# Patient Record
Sex: MALE | Race: WHITE | NOT HISPANIC OR LATINO | Employment: OTHER | URBAN - NONMETROPOLITAN AREA
[De-identification: names, ages, dates, MRNs, and addresses within clinical notes are randomized per-mention and may not be internally consistent; named-entity substitution may affect disease eponyms.]

---

## 2017-04-12 ENCOUNTER — OFFICE VISIT (OUTPATIENT)
Dept: UROLOGY | Facility: CLINIC | Age: 82
End: 2017-04-12

## 2017-04-12 DIAGNOSIS — R97.20 ELEVATED PSA: Primary | ICD-10-CM

## 2017-04-12 PROCEDURE — 99213 OFFICE O/P EST LOW 20 MIN: CPT | Performed by: UROLOGY

## 2017-04-12 NOTE — PROGRESS NOTES
Chief Complaint:          Chief Complaint   Patient presents with   • Elevated PSA     FU for Elevated PSA.       HPI:   81 y.o. male.    HPI  Pt has had a progressively elevated psa of 6.7 up from 5.4 on 9/30.      Past Medical History:        Past Medical History:   Diagnosis Date   • AAA (abdominal aortic aneurysm)          Current Meds:     Current Outpatient Prescriptions   Medication Sig Dispense Refill   • acetaminophen (TYLENOL) 650 MG 8 hr tablet Take 650 mg by mouth Every 8 (Eight) Hours As Needed for mild pain (1-3).     • Brinzolamide-Brimonidine (SIMBRINZA) 1-0.2 % suspension Apply  to eye.     • chlorthalidone (HYGROTEN) 25 MG tablet Take 25 mg by mouth Daily.     • fenofibrate (TRICOR) 145 MG tablet Take 145 mg by mouth Daily.     • Omega-3 Fatty Acids (FISH OIL) 1000 MG capsule capsule Take 1,000 mg by mouth Daily With Breakfast.     • simvastatin (ZOCOR) 20 MG tablet Take 20 mg by mouth Every Night.       No current facility-administered medications for this visit.         Allergies:      Allergies   Allergen Reactions   • Codeine    • Morphine And Related    • Percocet [Oxycodone-Acetaminophen]         Past Surgical History:     Past Surgical History:   Procedure Laterality Date   • ABDOMINAL AORTIC ANEURYSM REPAIR     • BACK SURGERY     • CARDIAC SURGERY     • GALLBLADDER SURGERY           Social History:     Social History     Social History   • Marital status: Unknown     Spouse name: N/A   • Number of children: N/A   • Years of education: N/A     Occupational History   • Not on file.     Social History Main Topics   • Smoking status: Former Smoker   • Smokeless tobacco: Never Used   • Alcohol use No   • Drug use: No   • Sexual activity: Not on file     Other Topics Concern   • Not on file     Social History Narrative       Family History:     No family history on file.    Review of Systems:     Review of Systems    Physical Exam:     Physical Exam    Procedure:     No notes on  file      Assessment:     Encounter Diagnosis   Name Primary?   • Elevated PSA Yes     No orders of the defined types were placed in this encounter.      Plan:   Will order a free and total psa and see pt in 6 months.    Counseling was given to patient for the following topics diagnostic results. and the interim medical history and current results were reviewed.  A treatment plan with follow-up was made. Total time of the encounter was 21 minutes and 21 minutes were spent discussing Elevated PSA [R97.20] face-to-face.        This document has been electronically signed by Tom Pineda MD April 12, 2017 10:45 AM

## 2017-05-07 ENCOUNTER — HOSPITAL ENCOUNTER (INPATIENT)
Dept: HOSPITAL 79 - ER1 | Age: 82
LOS: 2 days | Discharge: HOME | DRG: 305 | End: 2017-05-09
Attending: HOSPITALIST | Admitting: INTERNAL MEDICINE
Payer: MEDICARE

## 2017-05-07 VITALS — HEIGHT: 68 IN | BODY MASS INDEX: 26.22 KG/M2 | WEIGHT: 173 LBS

## 2017-05-07 DIAGNOSIS — I16.0: Primary | ICD-10-CM

## 2017-05-07 DIAGNOSIS — Z95.820: ICD-10-CM

## 2017-05-07 DIAGNOSIS — R27.0: ICD-10-CM

## 2017-05-07 DIAGNOSIS — Z98.890: ICD-10-CM

## 2017-05-07 DIAGNOSIS — I65.21: ICD-10-CM

## 2017-05-07 DIAGNOSIS — R51: ICD-10-CM

## 2017-05-07 DIAGNOSIS — Z88.6: ICD-10-CM

## 2017-05-07 DIAGNOSIS — E78.5: ICD-10-CM

## 2017-05-07 DIAGNOSIS — I10: ICD-10-CM

## 2017-05-07 DIAGNOSIS — Z88.5: ICD-10-CM

## 2017-05-07 DIAGNOSIS — Z87.891: ICD-10-CM

## 2017-05-07 DIAGNOSIS — E78.1: ICD-10-CM

## 2017-05-07 DIAGNOSIS — Z79.899: ICD-10-CM

## 2017-05-07 DIAGNOSIS — Z90.49: ICD-10-CM

## 2017-05-07 DIAGNOSIS — Z82.3: ICD-10-CM

## 2017-05-07 DIAGNOSIS — H40.9: ICD-10-CM

## 2017-05-07 DIAGNOSIS — R42: ICD-10-CM

## 2017-05-07 DIAGNOSIS — R00.1: ICD-10-CM

## 2017-05-07 LAB
BUN/CREATININE RATIO: 17 (ref 0–10)
HGB BLD-MCNC: 15 GM/DL (ref 14–17.5)
RED BLOOD COUNT: 4.63 M/UL (ref 4.2–5.5)
WHITE BLOOD COUNT: 6.5 K/UL (ref 4.5–11)

## 2017-05-09 LAB
BUN/CREATININE RATIO: 23 (ref 0–10)
HGB BLD-MCNC: 14.8 GM/DL (ref 14–17.5)
RED BLOOD COUNT: 4.55 M/UL (ref 4.2–5.5)
WHITE BLOOD COUNT: 7.2 K/UL (ref 4.5–11)

## 2017-10-24 ENCOUNTER — OFFICE VISIT (OUTPATIENT)
Dept: UROLOGY | Facility: CLINIC | Age: 82
End: 2017-10-24

## 2017-10-24 VITALS — BODY MASS INDEX: 27 KG/M2 | HEIGHT: 67 IN | WEIGHT: 172 LBS

## 2017-10-24 DIAGNOSIS — R97.20 ELEVATED PSA: Primary | ICD-10-CM

## 2017-10-24 PROCEDURE — 99213 OFFICE O/P EST LOW 20 MIN: CPT | Performed by: UROLOGY

## 2017-10-24 NOTE — PROGRESS NOTES
Chief Complaint:          Chief Complaint   Patient presents with   • Elevated PSA     6 month f/u        HPI:   81 y.o. male.    HPI  Pt's psa is 6.6 with 8.8% free.  His psa was 6.7 previous.      Past Medical History:        Past Medical History:   Diagnosis Date   • AAA (abdominal aortic aneurysm)          Current Meds:     Current Outpatient Prescriptions   Medication Sig Dispense Refill   • acetaminophen (TYLENOL) 650 MG 8 hr tablet Take 650 mg by mouth Every 8 (Eight) Hours As Needed for mild pain (1-3).     • Brinzolamide-Brimonidine (SIMBRINZA) 1-0.2 % suspension Apply  to eye.     • chlorthalidone (HYGROTEN) 25 MG tablet Take 25 mg by mouth Daily.     • fenofibrate (TRICOR) 145 MG tablet Take 145 mg by mouth Daily.     • Omega-3 Fatty Acids (FISH OIL) 1000 MG capsule capsule Take 1,000 mg by mouth Daily With Breakfast.     • simvastatin (ZOCOR) 20 MG tablet Take 20 mg by mouth Every Night.       No current facility-administered medications for this visit.         Allergies:      Allergies   Allergen Reactions   • Codeine    • Morphine And Related    • Percocet [Oxycodone-Acetaminophen]         Past Surgical History:     Past Surgical History:   Procedure Laterality Date   • ABDOMINAL AORTIC ANEURYSM REPAIR     • BACK SURGERY     • CARDIAC SURGERY     • GALLBLADDER SURGERY           Social History:     Social History     Social History   • Marital status: Unknown     Spouse name: N/A   • Number of children: N/A   • Years of education: N/A     Occupational History   • Not on file.     Social History Main Topics   • Smoking status: Former Smoker   • Smokeless tobacco: Never Used   • Alcohol use No   • Drug use: No   • Sexual activity: Not on file     Other Topics Concern   • Not on file     Social History Narrative       Family History:     History reviewed. No pertinent family history.    Review of Systems:     Review of Systems    Physical Exam:     Physical Exam   Constitutional: He is oriented to person,  place, and time.   HENT:   Head: Normocephalic and atraumatic.   Right Ear: External ear normal.   Left Ear: External ear normal.   Nose: Nose normal.   Mouth/Throat: Oropharynx is clear and moist.   Eyes: Conjunctivae and EOM are normal. Pupils are equal, round, and reactive to light.   Neck: Normal range of motion. Neck supple. No thyromegaly present.   Cardiovascular: Normal rate, regular rhythm, normal heart sounds and intact distal pulses.    No murmur heard.  Pulmonary/Chest: Effort normal and breath sounds normal. No respiratory distress. He has no wheezes. He has no rales. He exhibits no tenderness.   Abdominal: Soft. Bowel sounds are normal. He exhibits no distension and no mass. There is no tenderness. No hernia.   Genitourinary: Rectum normal, prostate normal and penis normal.   Musculoskeletal: Normal range of motion. He exhibits no edema or tenderness.   Lymphadenopathy:     He has no cervical adenopathy.   Neurological: He is alert and oriented to person, place, and time. No cranial nerve deficit. He exhibits normal muscle tone. Coordination normal.   Skin: Skin is warm. No rash noted.   Psychiatric: He has a normal mood and affect. His behavior is normal. Judgment and thought content normal.   Nursing note and vitals reviewed.      Procedure:     No notes on file      Assessment:     Encounter Diagnosis   Name Primary?   • Elevated PSA Yes     No orders of the defined types were placed in this encounter.      Plan:   Psa of  6.6 is probably within normal limits for men in their 80's.  The free psa of only 8.8 % is a little concerning.  I would recommend that he return in 6 months with a psa and TAMIKO.    Counseling was given to patient for the following topics diagnostic results. and the interim medical history and current results were reviewed.  A treatment plan with follow-up was made. Total time of the encounter was 24 minutes and 24 minutes were spent discussing Elevated PSA [R97.20]  face-to-face.        This document has been electronically signed by Tom Pineda MD October 24, 2017 3:18 PM

## 2018-05-01 ENCOUNTER — OFFICE VISIT (OUTPATIENT)
Dept: UROLOGY | Facility: CLINIC | Age: 83
End: 2018-05-01

## 2018-05-01 DIAGNOSIS — R97.20 ELEVATED PSA: Primary | ICD-10-CM

## 2018-05-01 DIAGNOSIS — N40.2 PROSTATE NODULE: ICD-10-CM

## 2018-05-01 LAB — PSA SERPL-MCNC: 5.56 NG/ML (ref 0–4)

## 2018-05-01 PROCEDURE — 99213 OFFICE O/P EST LOW 20 MIN: CPT | Performed by: UROLOGY

## 2018-05-01 PROCEDURE — 84153 ASSAY OF PSA TOTAL: CPT | Performed by: UROLOGY

## 2018-05-01 PROCEDURE — 36415 COLL VENOUS BLD VENIPUNCTURE: CPT | Performed by: UROLOGY

## 2018-05-01 RX ORDER — LEVOFLOXACIN 500 MG/1
TABLET, FILM COATED ORAL
Qty: 3 TABLET | Refills: 0 | Status: SHIPPED | OUTPATIENT
Start: 2018-05-01 | End: 2019-09-24

## 2018-05-01 RX ORDER — ALPRAZOLAM 0.5 MG/1
TABLET ORAL
Qty: 3 TABLET | Refills: 0 | Status: SHIPPED | OUTPATIENT
Start: 2018-05-01 | End: 2019-09-24

## 2018-05-01 NOTE — PROGRESS NOTES
Chief Complaint:          Elevated psa.    HPI:   82 y.o. male.  Pt here for elevated psa and low free psa.  He denies any voiding symptoms.  HPI      Past Medical History:        Past Medical History:   Diagnosis Date   • AAA (abdominal aortic aneurysm)          Current Meds:     Current Outpatient Prescriptions   Medication Sig Dispense Refill   • acetaminophen (TYLENOL) 650 MG 8 hr tablet Take 650 mg by mouth Every 8 (Eight) Hours As Needed for mild pain (1-3).     • Brinzolamide-Brimonidine (SIMBRINZA) 1-0.2 % suspension Apply  to eye.     • chlorthalidone (HYGROTEN) 25 MG tablet Take 25 mg by mouth Daily.     • fenofibrate (TRICOR) 145 MG tablet Take 145 mg by mouth Daily.     • Omega-3 Fatty Acids (FISH OIL) 1000 MG capsule capsule Take 1,000 mg by mouth Daily With Breakfast.     • simvastatin (ZOCOR) 20 MG tablet Take 20 mg by mouth Every Night.       No current facility-administered medications for this visit.         Allergies:      Allergies   Allergen Reactions   • Codeine    • Morphine And Related    • Percocet [Oxycodone-Acetaminophen]         Past Surgical History:     Past Surgical History:   Procedure Laterality Date   • ABDOMINAL AORTIC ANEURYSM REPAIR     • BACK SURGERY     • CARDIAC SURGERY     • GALLBLADDER SURGERY           Social History:     Social History     Social History   • Marital status: Unknown     Spouse name: N/A   • Number of children: N/A   • Years of education: N/A     Occupational History   • Not on file.     Social History Main Topics   • Smoking status: Former Smoker   • Smokeless tobacco: Never Used   • Alcohol use No   • Drug use: No   • Sexual activity: Not on file     Other Topics Concern   • Not on file     Social History Narrative   • No narrative on file       Family History:     No family history on file.    Review of Systems:     Review of Systems   Constitutional: Negative for chills and fever.   HENT: Negative for congestion, sinus pain and sore throat.     Respiratory: Negative for chest tightness, shortness of breath and wheezing.    Cardiovascular: Negative for chest pain and palpitations.   Gastrointestinal: Negative for diarrhea, nausea and vomiting.   Genitourinary: Negative for dysuria, flank pain and testicular pain.   Neurological: Negative for light-headedness.   Psychiatric/Behavioral: Negative for confusion.       IPSS Questionnaire (AUA-7):  Over the past month…    1)  How often have you had a sensation of not emptying your bladder completely after you finish urinating?  0 - Not at all   2)  How often have you had to urinate again less than two hours after you finished urinating? 0 - Not at all   3)  How often have you found you stopped and started again several times when you urinated?  0 - Not at all   4) How difficult have you found it to postpone urination?  0 - Not at all   5) How often have you had a weak urinary stream?  0 - Not at all   6) How often have you had to push or strain to begin urination?  0 - Not at all   7) How many times did you most typically get up to urinate from the time you went to bed until the time you got up in the morning?  1 - 1 time   Total Score:  1     I have reviewed the follow portions of the patient's history and confirmed they are accurate today:  allergies, current medications, past family history, past medical history, past social history, past surgical history, problem list and ROS  Physical Exam:     Physical Exam   Constitutional: He is oriented to person, place, and time.   HENT:   Head: Normocephalic and atraumatic.   Right Ear: External ear normal.   Left Ear: External ear normal.   Nose: Nose normal.   Mouth/Throat: Oropharynx is clear and moist.   Eyes: Conjunctivae and EOM are normal. Pupils are equal, round, and reactive to light.   Neck: Normal range of motion. Neck supple. No thyromegaly present.   Cardiovascular: Normal rate, regular rhythm, normal heart sounds and intact distal pulses.    No murmur  heard.  Pulmonary/Chest: Effort normal and breath sounds normal. No respiratory distress. He has no wheezes. He has no rales. He exhibits no tenderness.   Abdominal: Soft. Bowel sounds are normal. He exhibits no distension and no mass. There is no tenderness. No hernia.   Genitourinary: Rectum normal and penis normal.   Genitourinary Comments: Pt has a nodule about 5 mm in size in the lateral sulcus on the left   Musculoskeletal: Normal range of motion. He exhibits no edema or tenderness.   Lymphadenopathy:     He has no cervical adenopathy.   Neurological: He is alert and oriented to person, place, and time. No cranial nerve deficit. He exhibits normal muscle tone. Coordination normal.   Skin: Skin is warm. No rash noted.   Psychiatric: He has a normal mood and affect. His behavior is normal. Judgment and thought content normal.   Nursing note and vitals reviewed.      There were no vitals taken for this visit.   Procedure:         Assessment:     Encounter Diagnoses   Name Primary?   • Elevated PSA Yes   • Prostate nodule      No orders of the defined types were placed in this encounter.      Plan:   I would recommend a prostate ultrasound and biopsies  Rx levaquin and xanax for procedure    Patient's There is no height or weight on file to calculate BMI. BMI is within normal parameters. No follow-up required.      Counseling was given to patient for the following topics impressions as follows: prostate nodule. The interim medical history and current results were reviewed.  A treatment plan with follow-up was made for Elevated PSA [R97.20]This document has been electronically signed by Tom Pineda MD May 1, 2018 2:52 PM

## 2018-05-22 ENCOUNTER — TELEPHONE (OUTPATIENT)
Dept: UROLOGY | Facility: CLINIC | Age: 83
End: 2018-05-22

## 2018-05-23 ENCOUNTER — OFFICE VISIT (OUTPATIENT)
Dept: UROLOGY | Facility: CLINIC | Age: 83
End: 2018-05-23

## 2018-05-23 VITALS — WEIGHT: 172 LBS | BODY MASS INDEX: 27 KG/M2 | HEIGHT: 67 IN

## 2018-05-23 DIAGNOSIS — Z48.816 SURGICAL AFTERCARE, GENITOURINARY SYSTEM: ICD-10-CM

## 2018-05-23 DIAGNOSIS — R97.20 ELEVATED PSA: ICD-10-CM

## 2018-05-23 DIAGNOSIS — N40.2 PROSTATE NODULE: Primary | ICD-10-CM

## 2018-05-23 PROCEDURE — 76942 ECHO GUIDE FOR BIOPSY: CPT | Performed by: UROLOGY

## 2018-05-23 PROCEDURE — 76872 US TRANSRECTAL: CPT | Performed by: UROLOGY

## 2018-05-23 PROCEDURE — 96372 THER/PROPH/DIAG INJ SC/IM: CPT | Performed by: UROLOGY

## 2018-05-23 PROCEDURE — 55700 PR PROSTATE NEEDLE BIOPSY ANY APPROACH: CPT | Performed by: UROLOGY

## 2018-05-23 RX ORDER — CEFTRIAXONE 1 G/1
1 INJECTION, POWDER, FOR SOLUTION INTRAMUSCULAR; INTRAVENOUS ONCE
Status: COMPLETED | OUTPATIENT
Start: 2018-05-23 | End: 2018-05-23

## 2018-05-23 RX ADMIN — CEFTRIAXONE 1 G: 1 INJECTION, POWDER, FOR SOLUTION INTRAMUSCULAR; INTRAVENOUS at 14:44

## 2018-05-23 NOTE — PROGRESS NOTES
Chief Complaint:        prostate nodule and elevated psa      HPI:   82 y.o. male.  Pt had a left lateral nodule and a slightly elevated psa of 5.56.  Pt here for pus and bx.  HPI      Past Medical History:        Past Medical History:   Diagnosis Date   • AAA (abdominal aortic aneurysm)          Current Meds:     Current Outpatient Prescriptions   Medication Sig Dispense Refill   • acetaminophen (TYLENOL) 650 MG 8 hr tablet Take 650 mg by mouth Every 8 (Eight) Hours As Needed for mild pain (1-3).     • ALPRAZolam (XANAX) 0.5 MG tablet Bring to office for procedure. Staff will instruct on dose and timing. 3 tablet 0   • Brinzolamide-Brimonidine (SIMBRINZA) 1-0.2 % suspension Apply  to eye.     • chlorthalidone (HYGROTEN) 25 MG tablet Take 25 mg by mouth Daily.     • fenofibrate (TRICOR) 145 MG tablet Take 145 mg by mouth Daily.     • levoFLOXacin (LEVAQUIN) 500 MG tablet Take 1 tablet by mouth daily for three days starting the day prior to procedure. 3 tablet 0   • Omega-3 Fatty Acids (FISH OIL) 1000 MG capsule capsule Take 1,000 mg by mouth Daily With Breakfast.     • simvastatin (ZOCOR) 20 MG tablet Take 20 mg by mouth Every Night.       No current facility-administered medications for this visit.         Allergies:      Allergies   Allergen Reactions   • Codeine    • Morphine And Related    • Percocet [Oxycodone-Acetaminophen]         Past Surgical History:     Past Surgical History:   Procedure Laterality Date   • ABDOMINAL AORTIC ANEURYSM REPAIR     • BACK SURGERY     • CARDIAC SURGERY     • GALLBLADDER SURGERY           Social History:     Social History     Social History   • Marital status: Unknown     Spouse name: N/A   • Number of children: N/A   • Years of education: N/A     Occupational History   • Not on file.     Social History Main Topics   • Smoking status: Former Smoker   • Smokeless tobacco: Never Used   • Alcohol use No   • Drug use: No   • Sexual activity: Not on file     Other Topics Concern   •  Not on file     Social History Narrative   • No narrative on file       Family History:     History reviewed. No pertinent family history.    Review of Systems:     Review of Systems   Constitutional: Negative for chills, fatigue and fever.   HENT: Negative for congestion and sinus pressure.    Respiratory: Negative for shortness of breath.    Cardiovascular: Negative for chest pain.   Gastrointestinal: Negative for abdominal pain, constipation, diarrhea, nausea and vomiting.   Genitourinary: Negative for frequency and urgency.   Musculoskeletal: Negative for back pain and neck pain.   Neurological: Negative for dizziness and headaches.   Hematological: Does not bruise/bleed easily.   Psychiatric/Behavioral: The patient is not nervous/anxious.            I have reviewed the follow portions of the patient's history and confirmed they are accurate today:  allergies, current medications, past family history, past medical history, past social history, past surgical history, problem list and ROS  Physical Exam:     Physical Exam   Constitutional: He is oriented to person, place, and time.   HENT:   Head: Normocephalic and atraumatic.   Right Ear: External ear normal.   Left Ear: External ear normal.   Nose: Nose normal.   Mouth/Throat: Oropharynx is clear and moist.   Eyes: Conjunctivae and EOM are normal. Pupils are equal, round, and reactive to light.   Neck: Normal range of motion. Neck supple. No thyromegaly present.   Cardiovascular: Normal rate, regular rhythm, normal heart sounds and intact distal pulses.    No murmur heard.  Pulmonary/Chest: Effort normal and breath sounds normal. No respiratory distress. He has no wheezes. He has no rales. He exhibits no tenderness.   Abdominal: Soft. Bowel sounds are normal. He exhibits no distension and no mass. There is no tenderness. No hernia.   Genitourinary: Rectum normal and penis normal.   Genitourinary Comments: Prostate nodule on left side small pea size  "  Musculoskeletal: Normal range of motion. He exhibits no edema or tenderness.   Lymphadenopathy:     He has no cervical adenopathy.   Neurological: He is alert and oriented to person, place, and time. No cranial nerve deficit. He exhibits normal muscle tone. Coordination normal.   Skin: Skin is warm. No rash noted.   Psychiatric: He has a normal mood and affect. His behavior is normal. Judgment and thought content normal.   Nursing note and vitals reviewed.      Ht 170.2 cm (67.01\")   Wt 78 kg (172 lb)   BMI 26.93 kg/m²    Procedure:     Procedure: Transrectal Ultrasound and Guided Biopsies    Position: Fetal/left lateral decubitus    Prostate Ultrasound Guided lidocaine prostate block    Sedation: Oral xanax    Indications: prostate nodule and elevated psa    Procedures risks and benefits and risk and alternatives were discussed with the patient. Written Consent was obtained prior to procedure.    In patients without penicillin allergies, preoperative Oral antibiotics and pre-procedure rocephin administered.    Procedure details: 8 Mhz biplanar B&K probe was placed in the rectum. Prostate was scanned from the base of the bladder to the apex. Prostate size was measured at 34 cc prosthetic height 29.6 mm    width 47 mm   length 47mm    Seminal vesicals: normal  Prostate median lobe hypoechoic lesion left lateral base 8 mm in size    Patient is to return in 2 week to discuss pathology      Assessment:     Encounter Diagnoses   Name Primary?   • Surgical aftercare, genitourinary system    • Elevated PSA    • Prostate nodule Yes     No orders of the defined types were placed in this encounter.      Plan:   Will see pt in 2 weeks to discuss pathology    Patient's Body mass index is 26.93 kg/m². BMI is within normal parameters. No follow-up required.      Counseling was given to patient for the following topics diagnostic results including: prostate ultrasound. The interim medical history and current results were " reviewed.  A treatment plan with follow-up was made for Prostate nodule [N40.2].   This document has been electronically signed by Tom Pineda MD May 23, 2018 2:45 PM

## 2018-05-24 ENCOUNTER — TELEPHONE (OUTPATIENT)
Dept: GASTROENTEROLOGY | Facility: CLINIC | Age: 83
End: 2018-05-24

## 2018-05-24 NOTE — TELEPHONE ENCOUNTER
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 SHe is going to fax the pathology over.

## 2018-05-30 ENCOUNTER — OFFICE VISIT (OUTPATIENT)
Dept: UROLOGY | Facility: CLINIC | Age: 83
End: 2018-05-30

## 2018-05-30 VITALS — BODY MASS INDEX: 27 KG/M2 | WEIGHT: 172 LBS | HEIGHT: 67 IN

## 2018-05-30 DIAGNOSIS — C61 PROSTATE CANCER (HCC): Primary | ICD-10-CM

## 2018-05-30 PROCEDURE — 99215 OFFICE O/P EST HI 40 MIN: CPT | Performed by: UROLOGY

## 2018-09-25 ENCOUNTER — OFFICE VISIT (OUTPATIENT)
Dept: UROLOGY | Facility: CLINIC | Age: 83
End: 2018-09-25

## 2018-09-25 VITALS — WEIGHT: 172 LBS | HEIGHT: 67 IN | BODY MASS INDEX: 27 KG/M2

## 2018-09-25 DIAGNOSIS — C61 PROSTATE CANCER (HCC): Primary | ICD-10-CM

## 2018-09-25 PROCEDURE — 99213 OFFICE O/P EST LOW 20 MIN: CPT | Performed by: UROLOGY

## 2018-09-25 NOTE — PROGRESS NOTES
Chief Complaint:          Prostate cancer    HPI:   82 y.o. male.  Pt is sexually active and he would like to keep that activity  HPI      Past Medical History:        Past Medical History:   Diagnosis Date   • AAA (abdominal aortic aneurysm) (CMS/[facility])          Current Meds:     Current Outpatient Prescriptions   Medication Sig Dispense Refill   • acetaminophen (TYLENOL) 650 MG 8 hr tablet Take 650 mg by mouth Every 8 (Eight) Hours As Needed for mild pain (1-3).     • ALPRAZolam (XANAX) 0.5 MG tablet Bring to office for procedure. Staff will instruct on dose and timing. 3 tablet 0   • Brinzolamide-Brimonidine (SIMBRINZA) 1-0.2 % suspension Apply  to eye.     • chlorthalidone (HYGROTEN) 25 MG tablet Take 25 mg by mouth Daily.     • fenofibrate (TRICOR) 145 MG tablet Take 145 mg by mouth Daily.     • levoFLOXacin (LEVAQUIN) 500 MG tablet Take 1 tablet by mouth daily for three days starting the day prior to procedure. 3 tablet 0   • Omega-3 Fatty Acids (FISH OIL) 1000 MG capsule capsule Take 1,000 mg by mouth Daily With Breakfast.     • simvastatin (ZOCOR) 20 MG tablet Take 20 mg by mouth Every Night.       No current facility-administered medications for this visit.         Allergies:      Allergies   Allergen Reactions   • Codeine    • Morphine And Related    • Percocet [Oxycodone-Acetaminophen]         Past Surgical History:     Past Surgical History:   Procedure Laterality Date   • ABDOMINAL AORTIC ANEURYSM REPAIR     • BACK SURGERY     • CARDIAC SURGERY     • GALLBLADDER SURGERY           Social History:     Social History     Social History   • Marital status: Unknown     Spouse name: N/A   • Number of children: N/A   • Years of education: N/A     Occupational History   • Not on file.     Social History Main Topics   • Smoking status: Former Smoker   • Smokeless tobacco: Never Used   • Alcohol use No   • Drug use: No   • Sexual activity: Not on file     Other Topics Concern   • Not on file     Social History  "Narrative   • No narrative on file       Family History:     History reviewed. No pertinent family history.    Review of Systems:     Review of Systems   Constitutional: Negative for fatigue.   HENT: Negative for sinus pain.    Eyes: Negative for pain.   Respiratory: Negative for chest tightness.    Cardiovascular: Negative for chest pain.   Gastrointestinal: Negative for abdominal pain.   Endocrine: Negative for heat intolerance.   Genitourinary: Negative for frequency.   Musculoskeletal: Negative for back pain.   Skin: Negative for rash.   Allergic/Immunologic: Negative for food allergies.   Neurological: Negative for headaches.   Hematological: Does not bruise/bleed easily.       I have reviewed the follow portions of the patient's history and confirmed they are accurate today:  allergies, current medications, past family history, past medical history, past social history, past surgical history, problem list and ROS  Physical Exam:     Physical Exam    Ht 170.2 cm (67.01\")   Wt 78 kg (172 lb)   BMI 26.93 kg/m²    Procedure:         Assessment:     Encounter Diagnosis   Name Primary?   • Prostate cancer (CMS/HCC) Yes     No orders of the defined types were placed in this encounter.      Plan:   I discussed how the quality of life might be best with aggressive surveillance because hormonal therapy would eliminate it and radiation might include lupron as well.  Pt and I are in agreement.  Will see him for psa and TAMIKO 3 month.    Patient's Body mass index is 26.93 kg/m². BMI is within normal parameters. No follow-up required.      Counseling was given to patient for the following topics instructions for management as follows: prostate. The interim medical history and current results were reviewed.  A treatment plan with follow-up was made for Prostate cancer (CMS/HCC) [C61]. I spent 16 minutes face to face with Celso Mcelroy and 80 percentage was spent in counseling.    This document has been electronically signed by " Tom Pineda MD September 25, 2018 3:47 PM

## 2018-12-11 ENCOUNTER — LAB (OUTPATIENT)
Dept: LAB | Facility: HOSPITAL | Age: 83
End: 2018-12-11
Attending: UROLOGY

## 2018-12-11 DIAGNOSIS — C61 PROSTATE CANCER (HCC): ICD-10-CM

## 2018-12-11 LAB — PSA SERPL-MCNC: 7.97 NG/ML (ref 0–4)

## 2018-12-11 PROCEDURE — 84153 ASSAY OF PSA TOTAL: CPT

## 2018-12-11 PROCEDURE — 36415 COLL VENOUS BLD VENIPUNCTURE: CPT

## 2018-12-18 ENCOUNTER — OFFICE VISIT (OUTPATIENT)
Dept: UROLOGY | Facility: CLINIC | Age: 83
End: 2018-12-18

## 2018-12-18 VITALS — WEIGHT: 172 LBS | HEIGHT: 67 IN | BODY MASS INDEX: 27 KG/M2

## 2018-12-18 DIAGNOSIS — C61 PROSTATE CANCER (HCC): Primary | ICD-10-CM

## 2018-12-18 PROCEDURE — 99213 OFFICE O/P EST LOW 20 MIN: CPT | Performed by: UROLOGY

## 2018-12-18 NOTE — PROGRESS NOTES
Chief Complaint:          Prostate cancer    HPI:   82 y.o. male.  Pt is on aggressive surveillance for prostate cancer.  He is sexually active.  His psa this month is 7.97.  Pt had a palpable left lateral prostate nodule and psa of 5.56.  Prostate volume was 34 cc.Pt had 4 cores of 12 positive for prostate cancer grade 3+3=6  Pt has no symptoms.      HPI      Past Medical History:        Past Medical History:   Diagnosis Date   • AAA (abdominal aortic aneurysm) (CMS/HCC)          Current Meds:     Current Outpatient Medications   Medication Sig Dispense Refill   • acetaminophen (TYLENOL) 650 MG 8 hr tablet Take 650 mg by mouth Every 8 (Eight) Hours As Needed for mild pain (1-3).     • Brinzolamide-Brimonidine (SIMBRINZA) 1-0.2 % suspension Apply  to eye.     • chlorthalidone (HYGROTEN) 25 MG tablet Take 25 mg by mouth Daily.     • fenofibrate (TRICOR) 145 MG tablet Take 145 mg by mouth Daily.     • Omega-3 Fatty Acids (FISH OIL) 1000 MG capsule capsule Take 1,000 mg by mouth Daily With Breakfast.     • simvastatin (ZOCOR) 20 MG tablet Take 20 mg by mouth Every Night.     • ALPRAZolam (XANAX) 0.5 MG tablet Bring to office for procedure. Staff will instruct on dose and timing. 3 tablet 0   • levoFLOXacin (LEVAQUIN) 500 MG tablet Take 1 tablet by mouth daily for three days starting the day prior to procedure. 3 tablet 0     No current facility-administered medications for this visit.         Allergies:      Allergies   Allergen Reactions   • Codeine    • Morphine And Related    • Percocet [Oxycodone-Acetaminophen]         Past Surgical History:     Past Surgical History:   Procedure Laterality Date   • ABDOMINAL AORTIC ANEURYSM REPAIR     • BACK SURGERY     • CARDIAC SURGERY     • GALLBLADDER SURGERY           Social History:     Social History     Socioeconomic History   • Marital status: Unknown     Spouse name: Not on file   • Number of children: Not on file   • Years of education: Not on file   • Highest education  level: Not on file   Social Needs   • Financial resource strain: Not on file   • Food insecurity - worry: Not on file   • Food insecurity - inability: Not on file   • Transportation needs - medical: Not on file   • Transportation needs - non-medical: Not on file   Occupational History   • Not on file   Tobacco Use   • Smoking status: Former Smoker   • Smokeless tobacco: Never Used   Substance and Sexual Activity   • Alcohol use: No   • Drug use: No   • Sexual activity: Not on file   Other Topics Concern   • Not on file   Social History Narrative   • Not on file       Family History:     History reviewed. No pertinent family history.    Review of Systems:     Review of Systems   Constitutional: Negative for chills, fatigue and fever.   HENT: Negative for congestion and sinus pressure.    Respiratory: Negative for shortness of breath.    Cardiovascular: Negative for chest pain.   Gastrointestinal: Negative for abdominal pain, constipation, diarrhea, nausea and vomiting.   Genitourinary: Negative for frequency and urgency.   Musculoskeletal: Negative for back pain and neck pain.   Neurological: Negative for dizziness and headaches.   Hematological: Does not bruise/bleed easily.   Psychiatric/Behavioral: The patient is not nervous/anxious.              I have reviewed the follow portions of the patient's history and confirmed they are accurate today:  allergies, current medications, past family history, past medical history, past social history, past surgical history, problem list and ROS  Physical Exam:     Physical Exam   Constitutional: He is oriented to person, place, and time.   HENT:   Head: Normocephalic and atraumatic.   Right Ear: External ear normal.   Left Ear: External ear normal.   Nose: Nose normal.   Mouth/Throat: Oropharynx is clear and moist.   Eyes: Conjunctivae and EOM are normal. Pupils are equal, round, and reactive to light.   Neck: Normal range of motion. Neck supple. No thyromegaly present.  "  Cardiovascular: Normal rate, regular rhythm, normal heart sounds and intact distal pulses.   No murmur heard.  Pulmonary/Chest: Effort normal and breath sounds normal. No respiratory distress. He has no wheezes. He has no rales. He exhibits no tenderness.   Abdominal: Soft. Bowel sounds are normal. He exhibits no distension and no mass. There is no tenderness. No hernia.   Genitourinary: Rectum normal and penis normal.   Genitourinary Comments: Pt's prostate nodule is pea sized.  It has not changed in size.   Musculoskeletal: Normal range of motion. He exhibits no edema or tenderness.   Lymphadenopathy:     He has no cervical adenopathy.   Neurological: He is alert and oriented to person, place, and time. No cranial nerve deficit. He exhibits normal muscle tone. Coordination normal.   Skin: Skin is warm. No rash noted.   Psychiatric: He has a normal mood and affect. His behavior is normal. Judgment and thought content normal.   Nursing note and vitals reviewed.      Ht 170.2 cm (67\")   Wt 78 kg (172 lb)   BMI 26.94 kg/m²    Procedure:         Assessment:     Encounter Diagnosis   Name Primary?   • Prostate cancer (CMS/HCC) Yes     No orders of the defined types were placed in this encounter.      Plan:   I discussed the slight change in his psa.  Will see him in 3 months with psa prior.    Patient's Body mass index is 26.94 kg/m². BMI is within normal parameters. No follow-up required.      Counseling was given to patient for the following topics diagnostic results including: psa. The interim medical history and current results were reviewed.  A treatment plan with follow-up was made for Prostate cancer (CMS/HCC) [C61].This document has been electronically signed by Tom Pineda MD December 18, 2018 9:46 AM  "

## 2019-03-05 ENCOUNTER — LAB (OUTPATIENT)
Dept: LAB | Facility: HOSPITAL | Age: 84
End: 2019-03-05

## 2019-03-05 DIAGNOSIS — C61 PROSTATE CANCER (HCC): ICD-10-CM

## 2019-03-05 LAB — PSA SERPL-MCNC: 7.75 NG/ML (ref 0–4)

## 2019-03-05 PROCEDURE — 84153 ASSAY OF PSA TOTAL: CPT

## 2019-03-05 PROCEDURE — 36415 COLL VENOUS BLD VENIPUNCTURE: CPT

## 2019-03-19 ENCOUNTER — OFFICE VISIT (OUTPATIENT)
Dept: UROLOGY | Facility: CLINIC | Age: 84
End: 2019-03-19

## 2019-03-19 VITALS — BODY MASS INDEX: 27.47 KG/M2 | HEIGHT: 67 IN | WEIGHT: 175 LBS

## 2019-03-19 DIAGNOSIS — C61 PROSTATE CANCER (HCC): Primary | ICD-10-CM

## 2019-03-19 PROCEDURE — 99213 OFFICE O/P EST LOW 20 MIN: CPT | Performed by: UROLOGY

## 2019-03-19 NOTE — PROGRESS NOTES
Chief Complaint:          Prostate cancer    HPI:   83 y.o. male.  Pt's psa is 7.75 down from 7.97 in 12/18  In 2010 he had 2 cores + for prostate cancer grade 3 with score of 6.  Repeat biopsies in 5/2018 showed the same grade and 2 of 12 cores positive.  Pt is on aggressive surveillance for prostate cancer.  He is sexually active.  His psa this month is 7.97.  Pt had a palpable left lateral prostate nodule and psa of 5.56.  Prostate volume was 34 cc.Pt had 4 cores of 12 positive for prostate cancer grade 3+3=6  Pt has no symptoms.      HPI      Past Medical History:        Past Medical History:   Diagnosis Date   • AAA (abdominal aortic aneurysm) (CMS/[facility])          Current Meds:     Current Outpatient Medications   Medication Sig Dispense Refill   • acetaminophen (TYLENOL) 650 MG 8 hr tablet Take 650 mg by mouth Every 8 (Eight) Hours As Needed for mild pain (1-3).     • ALPRAZolam (XANAX) 0.5 MG tablet Bring to office for procedure. Staff will instruct on dose and timing. 3 tablet 0   • Brinzolamide-Brimonidine (SIMBRINZA) 1-0.2 % suspension Apply  to eye.     • chlorthalidone (HYGROTEN) 25 MG tablet Take 25 mg by mouth Daily.     • fenofibrate (TRICOR) 145 MG tablet Take 145 mg by mouth Daily.     • levoFLOXacin (LEVAQUIN) 500 MG tablet Take 1 tablet by mouth daily for three days starting the day prior to procedure. 3 tablet 0   • Omega-3 Fatty Acids (FISH OIL) 1000 MG capsule capsule Take 1,000 mg by mouth Daily With Breakfast.     • simvastatin (ZOCOR) 20 MG tablet Take 20 mg by mouth Every Night.       No current facility-administered medications for this visit.         Allergies:      Allergies   Allergen Reactions   • Codeine    • Morphine And Related    • Percocet [Oxycodone-Acetaminophen]         Past Surgical History:     Past Surgical History:   Procedure Laterality Date   • ABDOMINAL AORTIC ANEURYSM REPAIR     • BACK SURGERY     • CARDIAC SURGERY     • GALLBLADDER SURGERY           Social History:      Social History     Socioeconomic History   • Marital status: Unknown     Spouse name: Not on file   • Number of children: Not on file   • Years of education: Not on file   • Highest education level: Not on file   Social Needs   • Financial resource strain: Not on file   • Food insecurity - worry: Not on file   • Food insecurity - inability: Not on file   • Transportation needs - medical: Not on file   • Transportation needs - non-medical: Not on file   Occupational History   • Not on file   Tobacco Use   • Smoking status: Former Smoker   • Smokeless tobacco: Never Used   Substance and Sexual Activity   • Alcohol use: No   • Drug use: No   • Sexual activity: Not on file   Other Topics Concern   • Not on file   Social History Narrative   • Not on file       Family History:     History reviewed. No pertinent family history.    Review of Systems:     Review of Systems   Constitutional: Negative for chills, fatigue and fever.   HENT: Negative for sinus pain.    Respiratory: Negative for cough.    Cardiovascular: Negative for leg swelling.   Gastrointestinal: Negative for abdominal pain and constipation.   Genitourinary: Positive for frequency. Negative for dysuria and urgency.   Neurological: Negative for headaches.   Psychiatric/Behavioral: The patient is not nervous/anxious.          Physical Exam:     Physical Exam   Constitutional: He is oriented to person, place, and time.   HENT:   Head: Normocephalic and atraumatic.   Right Ear: External ear normal.   Left Ear: External ear normal.   Nose: Nose normal.   Mouth/Throat: Oropharynx is clear and moist.   Eyes: Conjunctivae and EOM are normal. Pupils are equal, round, and reactive to light.   Neck: Normal range of motion. Neck supple. No thyromegaly present.   Cardiovascular: Normal rate, regular rhythm, normal heart sounds and intact distal pulses.   No murmur heard.  Pulmonary/Chest: Effort normal and breath sounds normal. No respiratory distress. He has no  "wheezes. He has no rales. He exhibits no tenderness.   Abdominal: Soft. Bowel sounds are normal. He exhibits no distension and no mass. There is no tenderness. No hernia.   Genitourinary: Rectum normal, prostate normal and penis normal.   Genitourinary Comments: Pea size nodule is not changed on the left side.   Musculoskeletal: Normal range of motion. He exhibits no edema or tenderness.   Lymphadenopathy:     He has no cervical adenopathy.   Neurological: He is alert and oriented to person, place, and time. No cranial nerve deficit. He exhibits normal muscle tone. Coordination normal.   Skin: Skin is warm. No rash noted.   Psychiatric: He has a normal mood and affect. His behavior is normal. Judgment and thought content normal.   Nursing note and vitals reviewed.      Ht 170.2 cm (67\")   Wt 79.4 kg (175 lb)   BMI 27.41 kg/m²    Procedure:         Assessment:     Encounter Diagnosis   Name Primary?   • Prostate cancer (CMS/HCC) Yes     No orders of the defined types were placed in this encounter.      Plan:   Pt's left pea side nodule is unchanged and his psa is stable and will see him back in 6 months with psa prior and will repeat TAMIKO.  Pt and I agree that we should wait until the psa doubles about to the level of 16.    Patient's Body mass index is 27.41 kg/m². BMI is within normal parameters. No follow-up required..      Counseling was given to patient for the following topics diagnostic results including: psa. The interim medical history and current results were reviewed.  A treatment plan with follow-up was made for Prostate cancer (CMS/HCC) [C61].  This document has been electronically signed by Tom Pineda MD March 19, 2019 11:08 AM  "

## 2019-09-16 ENCOUNTER — LAB (OUTPATIENT)
Dept: LAB | Facility: HOSPITAL | Age: 84
End: 2019-09-16

## 2019-09-16 DIAGNOSIS — C61 PROSTATE CANCER (HCC): ICD-10-CM

## 2019-09-16 LAB — PSA SERPL-MCNC: 10.1 NG/ML (ref 0–4)

## 2019-09-16 PROCEDURE — 84153 ASSAY OF PSA TOTAL: CPT

## 2019-09-16 PROCEDURE — 36415 COLL VENOUS BLD VENIPUNCTURE: CPT

## 2019-09-24 ENCOUNTER — OFFICE VISIT (OUTPATIENT)
Dept: UROLOGY | Facility: CLINIC | Age: 84
End: 2019-09-24

## 2019-09-24 VITALS
HEIGHT: 67 IN | WEIGHT: 169 LBS | BODY MASS INDEX: 26.53 KG/M2 | DIASTOLIC BLOOD PRESSURE: 62 MMHG | SYSTOLIC BLOOD PRESSURE: 118 MMHG

## 2019-09-24 DIAGNOSIS — C61 PROSTATE CANCER (HCC): Primary | ICD-10-CM

## 2019-09-24 PROCEDURE — 99214 OFFICE O/P EST MOD 30 MIN: CPT | Performed by: UROLOGY

## 2019-09-24 RX ORDER — TRAVOPROST 0.004 %
DROPS OPHTHALMIC (EYE) AS NEEDED
COMMUNITY
Start: 2019-08-14 | End: 2021-11-18 | Stop reason: ALTCHOICE

## 2019-09-24 RX ORDER — ALPRAZOLAM 0.5 MG/1
TABLET ORAL
Qty: 3 TABLET | Refills: 0 | Status: SHIPPED | OUTPATIENT
Start: 2019-09-24 | End: 2019-10-29

## 2019-09-24 RX ORDER — LEVOFLOXACIN 500 MG/1
TABLET, FILM COATED ORAL
Qty: 3 TABLET | Refills: 0 | Status: SHIPPED | OUTPATIENT
Start: 2019-09-24 | End: 2019-10-29

## 2019-09-24 RX ORDER — AMLODIPINE BESYLATE 2.5 MG/1
2.5 TABLET ORAL EVERY EVENING
COMMUNITY
Start: 2019-09-04

## 2019-09-24 RX ORDER — VALSARTAN 320 MG/1
320 TABLET ORAL
COMMUNITY
Start: 2019-07-08

## 2019-09-24 NOTE — PROGRESS NOTES
Chief Complaint:          Prostate cancer    HPI:   83 y.o. male.  Pt is on aggressive surveillance for prostate cancer.  He is sexually active.  His psa this month is 10.1.  Pt had a palpable left lateral prostate nodule and psa 6 months ago was 7.7.  Prostate volume was 34 cc.Pt had 4 cores of 12 positive for prostate cancer grade 3+3=6  Pt has no symptoms.  Pt has been watched for his prostate cancer for 2 years.  Pt is a non smoker.  His family usually lived into their late 90's if no accidents befell them.  HPI      Past Medical History:        Past Medical History:   Diagnosis Date   • AAA (abdominal aortic aneurysm) (CMS/[facility])          Current Meds:     Current Outpatient Medications   Medication Sig Dispense Refill   • acetaminophen (TYLENOL) 650 MG 8 hr tablet Take 650 mg by mouth Every 8 (Eight) Hours As Needed for mild pain (1-3).     • amLODIPine (NORVASC) 2.5 MG tablet Daily.     • Brinzolamide-Brimonidine (SIMBRINZA) 1-0.2 % suspension Apply  to eye.     • chlorthalidone (HYGROTEN) 25 MG tablet Take 25 mg by mouth Daily.     • fenofibrate (TRICOR) 145 MG tablet Take 145 mg by mouth Daily.     • Omega-3 Fatty Acids (FISH OIL) 1000 MG capsule capsule Take 1,000 mg by mouth Daily With Breakfast.     • simvastatin (ZOCOR) 20 MG tablet Take 20 mg by mouth Every Night.     • TRAVATAN Z 0.004 % solution ophthalmic solution As Needed.     • valsartan (DIOVAN) 320 MG tablet Daily.       No current facility-administered medications for this visit.         Allergies:      Allergies   Allergen Reactions   • Codeine    • Morphine And Related    • Percocet [Oxycodone-Acetaminophen]         Past Surgical History:     Past Surgical History:   Procedure Laterality Date   • ABDOMINAL AORTIC ANEURYSM REPAIR     • BACK SURGERY     • CARDIAC SURGERY     • GALLBLADDER SURGERY           Social History:     Social History     Socioeconomic History   • Marital status: Unknown     Spouse name: Not on file   • Number of children:  Not on file   • Years of education: Not on file   • Highest education level: Not on file   Tobacco Use   • Smoking status: Former Smoker   • Smokeless tobacco: Never Used   Substance and Sexual Activity   • Alcohol use: No   • Drug use: No       Family History:     Family History   Problem Relation Age of Onset   • No Known Problems Father    • No Known Problems Mother        Review of Systems:     Review of Systems   Constitutional: Negative for chills and fever.   HENT: Negative for sinus pressure and sinus pain.    Respiratory: Negative for cough.    Cardiovascular: Negative for leg swelling.   Gastrointestinal: Negative for abdominal pain.   Genitourinary: Negative for dysuria, frequency and urgency.   Musculoskeletal: Negative for back pain.   Neurological: Negative for headaches.   Psychiatric/Behavioral: The patient is not nervous/anxious.        IPSS Questionnaire (AUA-7):  Over the past month…    1)  Incomplete Emptying  How often have you had a sensation of not emptying your bladder?  0 - Not at all   2)  Frequency  How often have you had to urinate less than every two hours? 0 - Not at all   3)  Intermittency  How often have you found you stopped and started again several times when you urinated?  0 - Not at all   4) Urgency  How often have you found it difficult to postpone urination?  0 - Not at all   5) Weak Stream  How often have you had a weak urinary stream?  0 - Not at all   6) Straining  How often have you had to push or strain to begin urination?  0 - Not at all   7) Nocturia  How many times did you typically get up at night to urinate?  2 - 2 times   Total Score:  2       Quality of life due to urinary symptoms:  If you were to spend the rest of your life with your urinary condition the way it is now, how would you feel about that? 1-Pleased   Urine Leakage (Incontinence) 0-No Leakage       I have reviewed the follow portions of the patient's history and confirmed they are accurate today:   "allergies, current medications, past family history, past medical history, past social history, past surgical history, problem list and ROS  Physical Exam:     Physical Exam   Constitutional: He is oriented to person, place, and time.   HENT:   Head: Normocephalic and atraumatic.   Right Ear: External ear normal.   Left Ear: External ear normal.   Nose: Nose normal.   Mouth/Throat: Oropharynx is clear and moist.   Eyes: Conjunctivae and EOM are normal. Pupils are equal, round, and reactive to light.   Neck: Normal range of motion. Neck supple. No thyromegaly present.   Cardiovascular: Normal rate, regular rhythm, normal heart sounds and intact distal pulses.   No murmur heard.  Pulmonary/Chest: Effort normal and breath sounds normal. No respiratory distress. He has no wheezes. He has no rales. He exhibits no tenderness.   Abdominal: Soft. Bowel sounds are normal. He exhibits no distension and no mass. There is no tenderness. No hernia.   Genitourinary: Rectum normal, prostate normal and penis normal.   Genitourinary Comments: Nodule on the left hard about the same size as last exam.   Musculoskeletal: Normal range of motion. He exhibits no edema or tenderness.   Lymphadenopathy:     He has no cervical adenopathy.   Neurological: He is alert and oriented to person, place, and time. No cranial nerve deficit. He exhibits normal muscle tone. Coordination normal.   Skin: Skin is warm. No rash noted.   Psychiatric: He has a normal mood and affect. His behavior is normal. Judgment and thought content normal.   Nursing note and vitals reviewed.      /62 (BP Location: Left arm, Patient Position: Sitting, Cuff Size: Adult)   Ht 170.2 cm (67\")   Wt 76.7 kg (169 lb)   BMI 26.47 kg/m²    Procedure:         Assessment:     Encounter Diagnosis   Name Primary?   • Prostate cancer (CMS/HCC) Yes       No orders of the defined types were placed in this encounter.      Patient reports that he is not currently experiencing any " symptoms of urinary incontinence.      Plan:   With his psa climbing and a nodule on exam I think we should repeat his prostate ultrasound and biopsieis.    Patient's Body mass index is 26.47 kg/m². BMI is within normal parameters. No follow-up required..      Smoking Cessation Counseling:  Never a smoker.  Patient does not currently use any tobacco products.       Counseling was given to patient for the following topics instructions for management as follows: prostate cancer. The interim medical history and current results were reviewed.  A treatment plan with follow-up was made for Prostate cancer (CMS/[facility]) [C61].   This document has been electronically signed by Tom Pineda MD September 24, 2019 10:08 AM

## 2019-10-15 ENCOUNTER — PRIOR AUTHORIZATION (OUTPATIENT)
Dept: UROLOGY | Facility: CLINIC | Age: 84
End: 2019-10-15

## 2019-10-15 NOTE — TELEPHONE ENCOUNTER
Patient will need to pay $17.75 on the day of his procedure. Tried calling patient but his phone just rang no answer.

## 2019-10-23 ENCOUNTER — PROCEDURE VISIT (OUTPATIENT)
Dept: UROLOGY | Facility: CLINIC | Age: 84
End: 2019-10-23

## 2019-10-23 VITALS — BODY MASS INDEX: 26.59 KG/M2 | WEIGHT: 169.4 LBS | HEIGHT: 67 IN

## 2019-10-23 DIAGNOSIS — Z48.816 AFTERCARE FOLLOWING SURGERY OF THE GENITOURINARY SYSTEM: ICD-10-CM

## 2019-10-23 DIAGNOSIS — C61 PROSTATE CANCER (HCC): Primary | ICD-10-CM

## 2019-10-23 PROCEDURE — 96372 THER/PROPH/DIAG INJ SC/IM: CPT | Performed by: UROLOGY

## 2019-10-23 PROCEDURE — 55700 PR PROSTATE NEEDLE BIOPSY ANY APPROACH: CPT | Performed by: UROLOGY

## 2019-10-23 PROCEDURE — 76872 US TRANSRECTAL: CPT | Performed by: UROLOGY

## 2019-10-23 RX ORDER — CEFTRIAXONE 1 G/1
1 INJECTION, POWDER, FOR SOLUTION INTRAMUSCULAR; INTRAVENOUS ONCE
Status: COMPLETED | OUTPATIENT
Start: 2019-10-23 | End: 2019-10-23

## 2019-10-23 RX ADMIN — CEFTRIAXONE 1 G: 1 INJECTION, POWDER, FOR SOLUTION INTRAMUSCULAR; INTRAVENOUS at 14:36

## 2019-10-23 NOTE — PROGRESS NOTES
Chief Complaint:          Prostate cancer    HPI:   83 y.o. male.  Elevated psa of 10 in pt with known prostate cancer.  HPI      Past Medical History:        Past Medical History:   Diagnosis Date   • AAA (abdominal aortic aneurysm) (CMS/HCC)          Current Meds:     Current Outpatient Medications   Medication Sig Dispense Refill   • acetaminophen (TYLENOL) 650 MG 8 hr tablet Take 650 mg by mouth Every 8 (Eight) Hours As Needed for mild pain (1-3).     • ALPRAZolam (XANAX) 0.5 MG tablet Bring to office for procedure. Staff will instruct on dose and timing. 3 tablet 0   • amLODIPine (NORVASC) 2.5 MG tablet Daily.     • Brinzolamide-Brimonidine (SIMBRINZA) 1-0.2 % suspension Apply  to eye.     • chlorthalidone (HYGROTEN) 25 MG tablet Take 25 mg by mouth Daily.     • fenofibrate (TRICOR) 145 MG tablet Take 145 mg by mouth Daily.     • levoFLOXacin (LEVAQUIN) 500 MG tablet Take 1 tablet by mouth daily for three days starting the day prior to procedure. 3 tablet 0   • Omega-3 Fatty Acids (FISH OIL) 1000 MG capsule capsule Take 1,000 mg by mouth Daily With Breakfast.     • simvastatin (ZOCOR) 20 MG tablet Take 20 mg by mouth Every Night.     • TRAVATAN Z 0.004 % solution ophthalmic solution As Needed.     • valsartan (DIOVAN) 320 MG tablet Daily.       Current Facility-Administered Medications   Medication Dose Route Frequency Provider Last Rate Last Dose   • cefTRIAXone (ROCEPHIN) injection 1 g  1 g Intramuscular Once Tom Pineda MD            Allergies:      Allergies   Allergen Reactions   • Codeine    • Morphine And Related    • Percocet [Oxycodone-Acetaminophen]         Past Surgical History:     Past Surgical History:   Procedure Laterality Date   • ABDOMINAL AORTIC ANEURYSM REPAIR     • BACK SURGERY     • CARDIAC SURGERY     • GALLBLADDER SURGERY           Social History:     Social History     Socioeconomic History   • Marital status: Unknown     Spouse name: Not on file   • Number of children: Not  "on file   • Years of education: Not on file   • Highest education level: Not on file   Tobacco Use   • Smoking status: Former Smoker   • Smokeless tobacco: Never Used   Substance and Sexual Activity   • Alcohol use: No   • Drug use: No       Family History:     Family History   Problem Relation Age of Onset   • No Known Problems Father    • No Known Problems Mother        Review of Systems:     Review of Systems   Constitutional: Negative for chills, fatigue and fever.   HENT: Negative for congestion and sinus pressure.    Respiratory: Negative for shortness of breath.    Cardiovascular: Negative for chest pain.   Gastrointestinal: Negative for abdominal pain, constipation, diarrhea, nausea and vomiting.   Genitourinary: Negative for frequency and urgency.   Musculoskeletal: Negative for back pain and neck pain.   Neurological: Negative for dizziness and headaches.   Hematological: Does not bruise/bleed easily.   Psychiatric/Behavioral: The patient is not nervous/anxious.              I have reviewed the follow portions of the patient's history and confirmed they are accurate today:  allergies, current medications, past family history, past medical history, past social history, past surgical history, problem list and ROS  Physical Exam:     Physical Exam    Ht 170.2 cm (67\")   Wt 76.8 kg (169 lb 6.4 oz)   BMI 26.53 kg/m²    Procedure:     Procedure: Transrectal Ultrasound and Guided Biopsies    Position: Fetal/left lateral decubitus    Prostate Ultrasound Guided lidocaine prostate block    Sedation: Oral xanax  Pt is on aggressive surveillance for prostate cancer.  He is sexually active.  His psa this month is 10.1.  Pt had a palpable left lateral prostate nodule and psa 6 months ago was 7.7.  Prostate volume was 34 cc.Pt had 4 cores of 12 positive for prostate cancer grade 3+3=6  Pt has no symptoms.  Pt has been watched for his prostate cancer for 2 years.  Pt is a non smoker.  His family usually lived into their " late 90's if no accidents befell them  Indications:     Procedures risks and benefits and risk and alternatives were discussed with the patient. Written Consent was obtained prior to procedure.    In patients without penicillin allergies, preoperative Oral antibiotics and pre-procedure rocephin administered.    Procedure details: 8 Mhz biplanar B&K probe was placed in the rectum. Prostate was scanned from the base of the bladder to the apex. Prostate size was measured at 28.8 cc prosthetic height 46 mm    width 42.2 mm   length 44.2mm    Seminal vesicals: normal  Prostate median lobe normal    Patient is to return in 1 week to discuss pathology. .        Assessment:     Encounter Diagnoses   Name Primary?   • Aftercare following surgery of the genitourinary system    • Prostate cancer (CMS/HCC) Yes       No orders of the defined types were placed in this encounter.       This document has been electronically signed by Tom Pineda MD October 23, 2019 2:35 PM

## 2019-10-29 ENCOUNTER — OFFICE VISIT (OUTPATIENT)
Dept: UROLOGY | Facility: CLINIC | Age: 84
End: 2019-10-29

## 2019-10-29 VITALS
BODY MASS INDEX: 26.53 KG/M2 | SYSTOLIC BLOOD PRESSURE: 116 MMHG | HEIGHT: 67 IN | WEIGHT: 169 LBS | DIASTOLIC BLOOD PRESSURE: 64 MMHG

## 2019-10-29 DIAGNOSIS — C61 PROSTATE CANCER (HCC): Primary | ICD-10-CM

## 2019-10-29 PROCEDURE — 99215 OFFICE O/P EST HI 40 MIN: CPT | Performed by: UROLOGY

## 2019-10-29 NOTE — PROGRESS NOTES
Chief Complaint:          Prostate cancer.    HPI:   83 y.o. male.    HPI  Pt is on aggressive surveillance for 2 years for prostate cancer.  He is sexually active.  His psa this month is 10.1.  Pt had a palpable left lateral prostate nodule and psa 6 months ago was 7.7.  Prostate volume was 34 cc.Pt had 4 cores of 12 positive for prostate cancer grade.   He has 3 of 63cores positive for prostate cancer grade 3 and 5 in 20% of each core.on the left side and one core of 3 on the right side had 5%    Past Medical History:        Past Medical History:   Diagnosis Date   • AAA (abdominal aortic aneurysm) (CMS/[facility])          Current Meds:     Current Outpatient Medications   Medication Sig Dispense Refill   • acetaminophen (TYLENOL) 650 MG 8 hr tablet Take 650 mg by mouth Every 8 (Eight) Hours As Needed for mild pain (1-3).     • amLODIPine (NORVASC) 2.5 MG tablet Daily.     • Brinzolamide-Brimonidine (SIMBRINZA) 1-0.2 % suspension Apply  to eye.     • chlorthalidone (HYGROTEN) 25 MG tablet Take 25 mg by mouth Daily.     • fenofibrate (TRICOR) 145 MG tablet Take 145 mg by mouth Daily.     • Omega-3 Fatty Acids (FISH OIL) 1000 MG capsule capsule Take 1,000 mg by mouth Daily With Breakfast.     • simvastatin (ZOCOR) 20 MG tablet Take 20 mg by mouth Every Night.     • TRAVATAN Z 0.004 % solution ophthalmic solution As Needed.     • valsartan (DIOVAN) 320 MG tablet Daily.       No current facility-administered medications for this visit.         Allergies:      Allergies   Allergen Reactions   • Codeine    • Morphine And Related    • Percocet [Oxycodone-Acetaminophen]         Past Surgical History:     Past Surgical History:   Procedure Laterality Date   • ABDOMINAL AORTIC ANEURYSM REPAIR     • BACK SURGERY     • CARDIAC SURGERY     • GALLBLADDER SURGERY           Social History:     Social History     Socioeconomic History   • Marital status: Unknown     Spouse name: Not on file   • Number of children: Not on file   • Years  of education: Not on file   • Highest education level: Not on file   Tobacco Use   • Smoking status: Former Smoker   • Smokeless tobacco: Never Used   Substance and Sexual Activity   • Alcohol use: No   • Drug use: No       Family History:     Family History   Problem Relation Age of Onset   • No Known Problems Father    • No Known Problems Mother        Review of Systems:     Review of Systems   Constitutional: Positive for fatigue. Negative for chills and fever.   HENT: Negative for sinus pressure and sinus pain.    Respiratory: Negative for cough.    Cardiovascular: Negative for leg swelling.   Gastrointestinal: Negative for abdominal pain.   Genitourinary: Negative for dysuria, frequency and urgency.   Musculoskeletal: Negative for back pain.   Neurological: Negative for headaches.   Psychiatric/Behavioral: The patient is nervous/anxious.          Physical Exam:     Physical Exam   Constitutional: He is oriented to person, place, and time.   HENT:   Head: Normocephalic and atraumatic.   Right Ear: External ear normal.   Left Ear: External ear normal.   Nose: Nose normal.   Mouth/Throat: Oropharynx is clear and moist.   Eyes: Conjunctivae and EOM are normal. Pupils are equal, round, and reactive to light.   Neck: Normal range of motion. Neck supple. No thyromegaly present.   Cardiovascular: Normal rate, regular rhythm, normal heart sounds and intact distal pulses.   No murmur heard.  Pulmonary/Chest: Effort normal and breath sounds normal. No respiratory distress. He has no wheezes. He has no rales. He exhibits no tenderness.   Abdominal: Soft. Bowel sounds are normal. He exhibits no distension and no mass. There is no tenderness. No hernia.   Genitourinary: Rectum normal, prostate normal and penis normal.   Musculoskeletal: Normal range of motion. He exhibits no edema or tenderness.   Lymphadenopathy:     He has no cervical adenopathy.   Neurological: He is alert and oriented to person, place, and time. No  "cranial nerve deficit. He exhibits normal muscle tone. Coordination normal.   Skin: Skin is warm. No rash noted.   Psychiatric: He has a normal mood and affect. His behavior is normal. Judgment and thought content normal.   Nursing note and vitals reviewed.      /64 (BP Location: Left arm, Patient Position: Sitting, Cuff Size: Adult)   Ht 170.2 cm (67\")   Wt 76.7 kg (169 lb)   BMI 26.47 kg/m²    Procedure:         Assessment:     Encounter Diagnosis   Name Primary?   • Prostate cancer (CMS/HCC) Yes       No orders of the defined types were placed in this encounter.        Plan:   Patient still has prostate confined cancer however since he has a palpable nodule in the left side I think he has stage T2b.   The patient and I discussed continued surveillance but both he and I agree that with his increased volume of cancer and now palpable cancer I think we should proceed to treatment.  We discussed androgen blockade versus radiation therapy versus a combination of the 2.  Risks and benefits were discussed of each 1.  The patient is still sexually active and this is a very important quality of life issue to he and his wife.  He would like to avoid androgen deprivation therapy.  Lupron would cause of immediate loss of libido and affect the patient's quality of life adversely.  The patient is leaning towards radiation therapy with [facility].  We will see the patient back in a week after he has time to read the prostate cancer handouts from the American [employer] Society, American urology foundation in the National Cancer [place].        I would recommend pt consider [facility]..    Counseling was given to patient for the following topics diagnostic results including: pathology. The interim medical history and current results were reviewed.  A treatment plan with follow-up was made for Prostate cancer (CMS/HCC) [C61]. I spent 48 minutes face to face with Celso Mcelroy and 80 percentage was spent in counseling.     "   This document has been electronically signed by Tom Pineda MD October 30, 2019 8:39 AM

## 2019-11-05 ENCOUNTER — OFFICE VISIT (OUTPATIENT)
Dept: UROLOGY | Facility: CLINIC | Age: 84
End: 2019-11-05

## 2019-11-05 VITALS — BODY MASS INDEX: 27.37 KG/M2 | HEIGHT: 67 IN | WEIGHT: 174.4 LBS

## 2019-11-05 DIAGNOSIS — C61 PROSTATE CANCER (HCC): Primary | ICD-10-CM

## 2019-11-05 PROCEDURE — 99214 OFFICE O/P EST MOD 30 MIN: CPT | Performed by: UROLOGY

## 2019-11-05 NOTE — PROGRESS NOTES
Chief Complaint:          Prostate cancer.    HPI:   83 y.o. male.  Pt has decided on [facility]  HPI  Pt is on aggressive surveillance for 2 years for prostate cancer.  He is sexually active.  His psa this month is 10.1.  Pt had a palpable left lateral prostate nodule and psa 6 months ago was 7.7.  Prostate volume was 34 cc.Pt had 4 cores of 12 positive for prostate cancer grade.   He has 3 of 63cores positive for prostate cancer grade 3 and 5 in 20% of each core.on the left side and one core of 3 on the right side had 5%    Past Medical History:        Past Medical History:   Diagnosis Date   • AAA (abdominal aortic aneurysm) (CMS/[facility])          Current Meds:     Current Outpatient Medications   Medication Sig Dispense Refill   • acetaminophen (TYLENOL) 650 MG 8 hr tablet Take 650 mg by mouth Every 8 (Eight) Hours As Needed for mild pain (1-3).     • amLODIPine (NORVASC) 2.5 MG tablet Daily.     • Brinzolamide-Brimonidine (SIMBRINZA) 1-0.2 % suspension Apply  to eye.     • chlorthalidone (HYGROTEN) 25 MG tablet Take 25 mg by mouth Daily.     • fenofibrate (TRICOR) 145 MG tablet Take 145 mg by mouth Daily.     • Omega-3 Fatty Acids (FISH OIL) 1000 MG capsule capsule Take 1,000 mg by mouth Daily With Breakfast.     • simvastatin (ZOCOR) 20 MG tablet Take 20 mg by mouth Every Night.     • TRAVATAN Z 0.004 % solution ophthalmic solution As Needed.     • valsartan (DIOVAN) 320 MG tablet Daily.       No current facility-administered medications for this visit.         Allergies:      Allergies   Allergen Reactions   • Codeine    • Morphine And Related    • Percocet [Oxycodone-Acetaminophen]         Past Surgical History:     Past Surgical History:   Procedure Laterality Date   • ABDOMINAL AORTIC ANEURYSM REPAIR     • BACK SURGERY     • CARDIAC SURGERY     • GALLBLADDER SURGERY           Social History:     Social History     Socioeconomic History   • Marital status: Unknown     Spouse name: Not on file   • Number of  "children: Not on file   • Years of education: Not on file   • Highest education level: Not on file   Tobacco Use   • Smoking status: Former Smoker   • Smokeless tobacco: Never Used   Substance and Sexual Activity   • Alcohol use: No   • Drug use: No       Family History:     Family History   Problem Relation Age of Onset   • No Known Problems Father    • No Known Problems Mother        Review of Systems:     Review of Systems   Constitutional: Positive for fatigue.         Physical Exam:     Physical Exam    Ht 170.2 cm (67\")   Wt 79.1 kg (174 lb 6.4 oz)   BMI 27.31 kg/m²    Procedure:         Assessment:     Encounter Diagnosis   Name Primary?   • Prostate cancer (CMS/HCC) Yes       Orders Placed This Encounter   Procedures   • Ambulatory Referral to Radiation Oncology     Referral Priority:   Routine     Referral Type:   Consultation     Referral Reason:   Specialty Services Required     Referred to Provider:   Jin Jaeger MD     Requested Specialty:   Radiation Oncology     Number of Visits Requested:   1       Patient reports that he is not currently experiencing any symptoms of urinary incontinence.      Plan:   The patient I discussed treatment options and he is done considerable reading on the subject and he is elected to proceed with radiation therapy the [facility] in [place].  I have made a referral to Dr. Julien.  Patient is expressed wishes if possible to have his treatment first or second week in December.  Told him I would do anything I could to help facilitate that.    I spoke with Dr. Mendoza today and he said we should proceed with 6 gold fiducial markers.   Will make arrangements in the office.    Counseling was given to patient for the following topics instructions for management as follows: prostate cancer and radiaton therapy. The interim medical history and current results were reviewed.  A treatment plan with follow-up was made for Prostate cancer (CMS/HCC) [C61]. I spent 38 " minutes face to face with Celso Mcelroy and 90 percentage was spent in counseling.       This document has been electronically signed by Tom Pineda MD November 5, 2019 5:33 PM

## 2019-11-06 PROBLEM — C61 PROSTATE CANCER (HCC): Status: ACTIVE | Noted: 2019-11-06

## 2019-11-06 RX ORDER — LEVOFLOXACIN 500 MG/1
TABLET, FILM COATED ORAL
Qty: 3 TABLET | Refills: 0 | Status: SHIPPED | OUTPATIENT
Start: 2019-11-06 | End: 2019-12-09

## 2019-11-06 RX ORDER — ALPRAZOLAM 0.5 MG/1
TABLET ORAL
Qty: 3 TABLET | Refills: 0 | Status: SHIPPED | OUTPATIENT
Start: 2019-11-06 | End: 2019-12-09

## 2019-11-08 ENCOUNTER — TELEPHONE (OUTPATIENT)
Dept: UROLOGY | Facility: CLINIC | Age: 84
End: 2019-11-08

## 2019-11-08 NOTE — TELEPHONE ENCOUNTER
Dr. Jaeger's office in [place] called and said that Dr. Pineda had texted him about a JG that was having fiducual markers placed and needed to be seen. I looked in Edwin's surgery schedule book and Celso is scheduled for the marker placement on 12/16 and the nurse said that he did not need to be seen until 3 weeks after the marker placement. Before I could call the pt the pt called and asked about appt time and date with Dr. Jaeger. I told him what they said and he was good with it.

## 2019-12-09 ENCOUNTER — APPOINTMENT (OUTPATIENT)
Dept: PREADMISSION TESTING | Facility: HOSPITAL | Age: 84
End: 2019-12-09

## 2019-12-09 LAB
ANION GAP SERPL CALCULATED.3IONS-SCNC: 11.7 MMOL/L (ref 5–15)
BUN BLD-MCNC: 21 MG/DL (ref 8–23)
BUN/CREAT SERPL: 19.3 (ref 7–25)
CALCIUM SPEC-SCNC: 9.4 MG/DL (ref 8.6–10.5)
CHLORIDE SERPL-SCNC: 104 MMOL/L (ref 98–107)
CO2 SERPL-SCNC: 23.3 MMOL/L (ref 22–29)
CREAT BLD-MCNC: 1.09 MG/DL (ref 0.76–1.27)
DEPRECATED RDW RBC AUTO: 42.6 FL (ref 37–54)
ERYTHROCYTE [DISTWIDTH] IN BLOOD BY AUTOMATED COUNT: 12.2 % (ref 12.3–15.4)
GFR SERPL CREATININE-BSD FRML MDRD: 65 ML/MIN/1.73
GLUCOSE BLD-MCNC: 150 MG/DL (ref 65–99)
HCT VFR BLD AUTO: 42.5 % (ref 37.5–51)
HGB BLD-MCNC: 14.3 G/DL (ref 13–17.7)
MCH RBC QN AUTO: 31.8 PG (ref 26.6–33)
MCHC RBC AUTO-ENTMCNC: 33.6 G/DL (ref 31.5–35.7)
MCV RBC AUTO: 94.7 FL (ref 79–97)
PLATELET # BLD AUTO: 253 10*3/MM3 (ref 140–450)
PMV BLD AUTO: 10.5 FL (ref 6–12)
POTASSIUM BLD-SCNC: 4.5 MMOL/L (ref 3.5–5.2)
RBC # BLD AUTO: 4.49 10*6/MM3 (ref 4.14–5.8)
SODIUM BLD-SCNC: 139 MMOL/L (ref 136–145)
WBC NRBC COR # BLD: 6.22 10*3/MM3 (ref 3.4–10.8)

## 2019-12-09 PROCEDURE — 80048 BASIC METABOLIC PNL TOTAL CA: CPT | Performed by: UROLOGY

## 2019-12-09 PROCEDURE — 93010 ELECTROCARDIOGRAM REPORT: CPT | Performed by: INTERNAL MEDICINE

## 2019-12-09 PROCEDURE — 36415 COLL VENOUS BLD VENIPUNCTURE: CPT

## 2019-12-09 PROCEDURE — 85027 COMPLETE CBC AUTOMATED: CPT | Performed by: UROLOGY

## 2019-12-09 PROCEDURE — 93005 ELECTROCARDIOGRAM TRACING: CPT

## 2019-12-09 NOTE — DISCHARGE INSTRUCTIONS
TAKE the following medications the morning of surgery:    All heart or blood pressure medications    Please discontinue all blood thinners and anticoagulants (except aspirin) prior to surgery as per your surgeon and cardiologist instructions.  Aspirin may be continued up to the day prior to surgery.    HOLD all diabetic medications the morning of surgery as order by physician.    Arrival time for surgery on 12/16/2019 will be given to you by Dr Pineda's office.     General Instructions:  • Do NOT eat or drink after midnight 12/15/2019 which includes water, mints, or gum.  • You may brush your teeth. Dental appliances that are removable must be taken out day of surgery.  • Do NOT smoke, chew tobacco, or drink alcohol within 24 hours prior to surgery.  • Bring medications in original bottles, any inhalers and if applicable your C-PAP/BI-PAP machine  • Bring any papers given to you in the doctor’s office  • Wear clean, comfortable clothes and socks  • Do NOT wear contact lenses or make-up or dark nail polish.  Bring a case for your glasses if applicable.  • Bring crutches or walker if applicable  • Leave all other valuables and jewelry at home  • If you were given a blood bank armband, continue to wear it until discharged.    Preventing a Surgical Site Infection:  • Shower the night before surgery (unless instructed otherwise) using a fresh bar of anti-bacterial soap (such as Dial) and clean washcloth.  Dry with a clean towel and dress in clean clothing.  • For 2 to 3 days before surgery, avoid shaving with a razor near where you will have surgery because the razor can irritate skin and make it easier to develop an infection.  Ask your surgeon if you will be receiving antibiotics prior to surgery.  • Make sure you, your family, and all healthcare providers clean their hands with soap and water or an alcohol-based hand  before caring for you or your wound.  • If at all possible, quit smoking as many days  before surgery as you can.    Day of Surgery:  Upon arrival, a pre-op nurse and anesthesiologist will review your health history, obtain vital signs, and answer questions you may have.  The only belongings needed at this time will be your home medications and if applicable you C-PAP/BI-PAP machine.  If you are staying overnight, your family can leave the rest of your belongings in the car and bring them to your room later.  A pre-op nurse will start an IV and you may receive medication in preparation for surgery.  Due to patient privacy and limited space, only one member of your family will be able to accompany you in the pre-op area.  While you are in surgery your family should notify the waiting room  if they leave the waiting room area and provide a contact number.  Please be aware that surgery does come with discomfort.  We want to make every effort to control your discomfort so please discuss any uncontrolled symptoms with your nurse.  Your doctor will most likely have prescribed pain medications.  If you are going home after surgery you will receive individualized written care instructions before being discharged.  A responsible adult must drive you to and from the hospital on the day of surgery and stay with you for 24 hours.  If you are staying overnight following surgery, you will be transported to your hospital room following the recovery period.

## 2019-12-16 ENCOUNTER — ANESTHESIA EVENT (OUTPATIENT)
Dept: PERIOP | Facility: HOSPITAL | Age: 84
End: 2019-12-16

## 2019-12-16 ENCOUNTER — HOSPITAL ENCOUNTER (OUTPATIENT)
Facility: HOSPITAL | Age: 84
Setting detail: HOSPITAL OUTPATIENT SURGERY
Discharge: HOME | End: 2019-12-16
Attending: UROLOGY | Admitting: UROLOGY

## 2019-12-16 ENCOUNTER — ANESTHESIA (OUTPATIENT)
Dept: PERIOP | Facility: HOSPITAL | Age: 84
End: 2019-12-16

## 2019-12-16 VITALS
TEMPERATURE: 98.1 F | BODY MASS INDEX: 25.2 KG/M2 | HEART RATE: 58 BPM | DIASTOLIC BLOOD PRESSURE: 65 MMHG | RESPIRATION RATE: 18 BRPM | WEIGHT: 166.25 LBS | SYSTOLIC BLOOD PRESSURE: 138 MMHG | OXYGEN SATURATION: 98 % | HEIGHT: 68 IN

## 2019-12-16 DIAGNOSIS — C61 PROSTATE CANCER (HCC): ICD-10-CM

## 2019-12-16 PROCEDURE — 25010000002 PROPOFOL 10 MG/ML EMULSION: Performed by: NURSE ANESTHETIST, CERTIFIED REGISTERED

## 2019-12-16 PROCEDURE — 76942 ECHO GUIDE FOR BIOPSY: CPT | Performed by: UROLOGY

## 2019-12-16 PROCEDURE — S0260 H&P FOR SURGERY: HCPCS | Performed by: UROLOGY

## 2019-12-16 PROCEDURE — 25010000003 CEFAZOLIN SODIUM-DEXTROSE 2-3 GM-%(50ML) RECONSTITUTED SOLUTION: Performed by: UROLOGY

## 2019-12-16 PROCEDURE — A4648 IMPLANTABLE TISSUE MARKER: HCPCS | Performed by: UROLOGY

## 2019-12-16 PROCEDURE — 55876 PLACE RT DEVICE/MARKER PROS: CPT | Performed by: UROLOGY

## 2019-12-16 DEVICE — MARKR TISS SOFT/3PRELOAD17G 1.2 20CM GLD: Type: IMPLANTABLE DEVICE | Status: FUNCTIONAL

## 2019-12-16 RX ORDER — MIDAZOLAM HYDROCHLORIDE 1 MG/ML
1 INJECTION INTRAMUSCULAR; INTRAVENOUS
Status: DISCONTINUED | OUTPATIENT
Start: 2019-12-16 | End: 2019-12-16 | Stop reason: HOSPADM

## 2019-12-16 RX ORDER — SODIUM CHLORIDE 0.9 % (FLUSH) 0.9 %
10 SYRINGE (ML) INJECTION EVERY 12 HOURS SCHEDULED
Status: DISCONTINUED | OUTPATIENT
Start: 2019-12-16 | End: 2019-12-16 | Stop reason: HOSPADM

## 2019-12-16 RX ORDER — PROPOFOL 10 MG/ML
VIAL (ML) INTRAVENOUS AS NEEDED
Status: DISCONTINUED | OUTPATIENT
Start: 2019-12-16 | End: 2019-12-16 | Stop reason: SURG

## 2019-12-16 RX ORDER — SULFAMETHOXAZOLE AND TRIMETHOPRIM 800; 160 MG/1; MG/1
1 TABLET ORAL 2 TIMES DAILY
Qty: 20 TABLET | Refills: 0 | Status: SHIPPED | OUTPATIENT
Start: 2019-12-16 | End: 2019-12-26

## 2019-12-16 RX ORDER — FENTANYL CITRATE 50 UG/ML
50 INJECTION, SOLUTION INTRAMUSCULAR; INTRAVENOUS
Status: DISCONTINUED | OUTPATIENT
Start: 2019-12-16 | End: 2019-12-16 | Stop reason: HOSPADM

## 2019-12-16 RX ORDER — MIDAZOLAM HYDROCHLORIDE 1 MG/ML
2 INJECTION INTRAMUSCULAR; INTRAVENOUS
Status: DISCONTINUED | OUTPATIENT
Start: 2019-12-16 | End: 2019-12-16 | Stop reason: HOSPADM

## 2019-12-16 RX ORDER — SODIUM CHLORIDE, SODIUM LACTATE, POTASSIUM CHLORIDE, CALCIUM CHLORIDE 600; 310; 30; 20 MG/100ML; MG/100ML; MG/100ML; MG/100ML
125 INJECTION, SOLUTION INTRAVENOUS CONTINUOUS
Status: DISCONTINUED | OUTPATIENT
Start: 2019-12-16 | End: 2019-12-16 | Stop reason: HOSPADM

## 2019-12-16 RX ORDER — ONDANSETRON 2 MG/ML
4 INJECTION INTRAMUSCULAR; INTRAVENOUS AS NEEDED
Status: DISCONTINUED | OUTPATIENT
Start: 2019-12-16 | End: 2019-12-16 | Stop reason: HOSPADM

## 2019-12-16 RX ORDER — IPRATROPIUM BROMIDE AND ALBUTEROL SULFATE 2.5; .5 MG/3ML; MG/3ML
3 SOLUTION RESPIRATORY (INHALATION) ONCE AS NEEDED
Status: DISCONTINUED | OUTPATIENT
Start: 2019-12-16 | End: 2019-12-16 | Stop reason: HOSPADM

## 2019-12-16 RX ORDER — CEFAZOLIN SODIUM 2 G/50ML
2 SOLUTION INTRAVENOUS ONCE
Status: COMPLETED | OUTPATIENT
Start: 2019-12-16 | End: 2019-12-16

## 2019-12-16 RX ORDER — MEPERIDINE HYDROCHLORIDE 25 MG/ML
12.5 INJECTION INTRAMUSCULAR; INTRAVENOUS; SUBCUTANEOUS
Status: DISCONTINUED | OUTPATIENT
Start: 2019-12-16 | End: 2019-12-16 | Stop reason: HOSPADM

## 2019-12-16 RX ORDER — SODIUM CHLORIDE 0.9 % (FLUSH) 0.9 %
10 SYRINGE (ML) INJECTION AS NEEDED
Status: DISCONTINUED | OUTPATIENT
Start: 2019-12-16 | End: 2019-12-16 | Stop reason: HOSPADM

## 2019-12-16 RX ADMIN — PROPOFOL 20 MG: 10 INJECTION, EMULSION INTRAVENOUS at 13:10

## 2019-12-16 RX ADMIN — SODIUM CHLORIDE, POTASSIUM CHLORIDE, SODIUM LACTATE AND CALCIUM CHLORIDE: 600; 310; 30; 20 INJECTION, SOLUTION INTRAVENOUS at 13:00

## 2019-12-16 RX ADMIN — CEFAZOLIN SODIUM 2 G: 2 SOLUTION INTRAVENOUS at 13:00

## 2019-12-16 RX ADMIN — PROPOFOL 70 MG: 10 INJECTION, EMULSION INTRAVENOUS at 13:08

## 2019-12-16 NOTE — ANESTHESIA PREPROCEDURE EVALUATION
Anesthesia Evaluation     history of anesthetic complications:  NPO Solid Status: > 8 hours  NPO Liquid Status: > 8 hours           Airway   Mallampati: II  TM distance: >3 FB  Neck ROM: full  No difficulty expected  Dental - normal exam     Pulmonary - normal exam   Cardiovascular - normal exam    (+) hypertension, CAD, hyperlipidemia,       Neuro/Psych  GI/Hepatic/Renal/Endo      Musculoskeletal     Abdominal  - normal exam   Substance History      OB/GYN          Other                        Anesthesia Plan    ASA 3     general     intravenous induction     Anesthetic plan, all risks, benefits, and alternatives have been provided, discussed and informed consent has been obtained with: patient.

## 2019-12-16 NOTE — ANESTHESIA POSTPROCEDURE EVALUATION
Patient: Celso Mcelroy    Procedure Summary     Date:  12/16/19 Room / Location:   [facility] /  [facility]    Anesthesia Start:  1300 Anesthesia Stop:  1313    Procedure:  PROSTATE FIDUCIAL MARKER PLACEMENT WITH 6 MARKERS (N/A ) Diagnosis:       Prostate cancer (CMS/HCC)      (Prostate cancer (CMS/HCC) [C61])    Surgeon:  Tom Pineda MD Provider:  Tom Hidalgo MD    Anesthesia Type:  general ASA Status:  3          Anesthesia Type: general    Vitals  Vitals Value Taken Time   /67 12/16/2019  1:28 PM   Temp 97.1 °F (36.2 °C) 12/16/2019  1:14 PM   Pulse 59 12/16/2019  1:28 PM   Resp 18 12/16/2019  1:28 PM   SpO2 96 % 12/16/2019  1:28 PM           Post Anesthesia Care and Evaluation    Patient location during evaluation: PHASE II  Patient participation: complete - patient participated  Level of consciousness: awake and alert  Pain score: 1  Pain management: adequate  Airway patency: patent  Anesthetic complications: No anesthetic complications  PONV Status: controlled  Cardiovascular status: acceptable  Respiratory status: acceptable  Hydration status: acceptable

## 2019-12-16 NOTE — H&P
Prostate cancer.     HPI:   83 y.o. male.  Pt has decided on [facility]  HPI  Pt is on aggressive surveillance for 2 years for prostate cancer.  He is sexually active.  His psa this month is 10.1.  Pt had a palpable left lateral prostate nodule and psa 6 months ago was 7.7.  Prostate volume was 34 cc.Pt had 4 cores of 12 positive for prostate cancer grade.   He has 3 of 63cores positive for prostate cancer grade 3 and 5 in 20% of each core.on the left side and one core of 3 on the right side had 5%     Past Medical History:         Medical History        Past Medical History:   Diagnosis Date   • AAA (abdominal aortic aneurysm) (CMS/[facility])                 Current Meds:      Current Medications          Current Outpatient Medications   Medication Sig Dispense Refill   • acetaminophen (TYLENOL) 650 MG 8 hr tablet Take 650 mg by mouth Every 8 (Eight) Hours As Needed for mild pain (1-3).       • amLODIPine (NORVASC) 2.5 MG tablet Daily.       • Brinzolamide-Brimonidine (SIMBRINZA) 1-0.2 % suspension Apply  to eye.       • chlorthalidone (HYGROTEN) 25 MG tablet Take 25 mg by mouth Daily.       • fenofibrate (TRICOR) 145 MG tablet Take 145 mg by mouth Daily.       • Omega-3 Fatty Acids (FISH OIL) 1000 MG capsule capsule Take 1,000 mg by mouth Daily With Breakfast.       • simvastatin (ZOCOR) 20 MG tablet Take 20 mg by mouth Every Night.       • TRAVATAN Z 0.004 % solution ophthalmic solution As Needed.       • valsartan (DIOVAN) 320 MG tablet Daily.          No current facility-administered medications for this visit.             Allergies:            Allergies   Allergen Reactions   • Codeine     • Morphine And Related     • Percocet [Oxycodone-Acetaminophen]           Past Surgical History:      Surgical History         Past Surgical History:   Procedure Laterality Date   • ABDOMINAL AORTIC ANEURYSM REPAIR       • BACK SURGERY       • CARDIAC SURGERY       • GALLBLADDER SURGERY                   Social History:  "     Social History   Social History            Socioeconomic History   • Marital status: Unknown       Spouse name: Not on file   • Number of children: Not on file   • Years of education: Not on file   • Highest education level: Not on file   Tobacco Use   • Smoking status: Former Smoker   • Smokeless tobacco: Never Used   Substance and Sexual Activity   • Alcohol use: No   • Drug use: No            Family History:            Family History   Problem Relation Age of Onset   • No Known Problems Father     • No Known Problems Mother           Review of Systems:      Review of Systems   Constitutional: Positive for fatigue.            Physical Exam:      Physical Exam     Ht 170.2 cm (67\")   Wt 79.1 kg (174 lb 6.4 oz)   BMI 27.31 kg/m²    Procedure:            Assessment:           Encounter Diagnosis   Name Primary?   • Prostate cancer (CMS/HCC) Yes         Orders Placed This Encounter   Procedures   • Ambulatory Referral to Radiation Oncology       Referral Priority:   Routine       Referral Type:   Consultation       Referral Reason:   Specialty Services Required       Referred to Provider:   Jin Jaeger MD       Requested Specialty:   Radiation Oncology       Number of Visits Requested:   1         Patient reports that he is not currently experiencing any symptoms of urinary incontinence.        Plan:   The patient I discussed treatment options and he is done considerable reading on the subject and he is elected to proceed with radiation therapy the [facility] in [place].  I have made a referral to Dr. Julien.  Patient is expressed wishes if possible to have his treatment first or second week in December.  Told him I would do anything I could to help facilitate that.       "

## 2019-12-16 NOTE — OP NOTE
PROSTATE FIDUCIAL MARKER PLACEMENT  Procedure Report    Patient Name:  Celso Mcelroy  YOB: 1935    Date of Surgery:  12/16/2019     Indications: Prostate cancer high-grade    Pre-op Diagnosis:  Prostate cancer (CMS/HCC) [C61]            Post-op Diagnosis:   Post-Op Diagnosis Codes:     * Prostate cancer (CMS/HCC) [C61]          Procedure/CPT® Codes:      Procedure(s):  PROSTATE FIDUCIAL MARKER PLACEMENT WITH 6 MARKERS    Staff:  Surgeon(s):  Tom Pineda MD         Anesthesia: Sedation    Estimated Blood Loss: None    Implants:    Implant Name Type Inv. Item Serial No.  Lot No. LRB No. Used   MARKR TISS SOFT/[id number] 1.2 20CM GLD - [id number] Implant MARKR TISS SOFT/[id number] 1.2 20CM GLD  [employer] [id number] N/A 1   MARKR TISS SOFT/[id number] 1.2 20CM GLD - [id number] Implant MARKR TISS SOFT/[id number] 1.2 20CM GLD  [employer] [id number] N/A 1       Specimen:                None      Findings: The prostate was scanned with a transrectal ultrasound and gold fiducial markers were placed at the base mid and apex of the prostate on both the right and left side    Complications: None    Description of Procedure: The patient was placed in the fetal position given IV sedation and then a BNK transrectal ultrasound probe was used to scan the prostate and diet the placement of the fiducial markers to the base mid and apex of the prostate on both the right and left side.  Tolerated the procedure well state recovery in satisfactory condition      Tom Pineda MD     Date: 12/16/2019  Time: 1:13 PM

## 2019-12-19 ENCOUNTER — OFFICE VISIT (OUTPATIENT)
Dept: RADIATION ONCOLOGY | Facility: HOSPITAL | Age: 84
End: 2019-12-19

## 2019-12-19 ENCOUNTER — HOSPITAL ENCOUNTER (OUTPATIENT)
Dept: RADIATION ONCOLOGY | Facility: HOSPITAL | Age: 84
Setting detail: RADIATION/ONCOLOGY SERIES
Discharge: HOME | End: 2019-12-19

## 2019-12-19 VITALS
RESPIRATION RATE: 20 BRPM | TEMPERATURE: 97.7 F | BODY MASS INDEX: 26.21 KG/M2 | WEIGHT: 172.4 LBS | SYSTOLIC BLOOD PRESSURE: 162 MMHG | DIASTOLIC BLOOD PRESSURE: 72 MMHG | OXYGEN SATURATION: 96 % | HEART RATE: 70 BPM

## 2019-12-19 DIAGNOSIS — C61 PROSTATE CANCER (HCC): Primary | ICD-10-CM

## 2019-12-19 PROCEDURE — G0463 HOSPITAL OUTPT CLINIC VISIT: HCPCS

## 2019-12-19 RX ORDER — TAMSULOSIN HYDROCHLORIDE 0.4 MG/1
1 CAPSULE ORAL NIGHTLY
Qty: 30 CAPSULE | Refills: 12 | Status: SHIPPED | OUTPATIENT
Start: 2019-12-19 | End: 2019-12-20

## 2019-12-19 NOTE — PROGRESS NOTES
CONSULTATION NOTE      :                                                          1935  DATE OF CONSULTATION:                       2019   REQUESTING PHYSICIAN:                   Tom Pineda, *  REASON FOR CONSULTATION:           Prostate cancer (CMS/formerly [facility])  - Stage IIB (cT2c, cN0, cM0, PSA: 10.1, Grade Group: 2)         BRIEF HISTORY:  The patient is a very pleasant 83 y.o. male  with recently diagnosed prostate cancer.  He presented with an elevated PSA value of 10.1 ng/mL on 2019.  This had increased from a value 7.75 ng/mL 9 months earlier.  He has moderate urinary outflow obstructive symptoms which were unchanged.  TAMIKO revealed a nodule in the left lateral lobe of the prostate gland.  Prostate measured 34 cc on ultrasound.  Biopsy performed 10/23/2019 showed prostatic adenocarcinoma.  Tumor involved 3 out of 6 cores in the left lobe and 1 out of 6 cores on the right.  Nelson's score 3+4 = 7 was present at the left lateral apex, involving 20% of submitted tissue.  Nelson score 3+3 = 6 was present at left lateral mid, involving 20% submitted tissue.  Nelson score 3+3 = 6 was present left lateral base involving 20% of submitted tissue, with presence of perineural invasion.  Tamie score 3+3 = 6 was present the right lateral apex, involving 5% submitted tissue.    Patient is very healthy for his age with excellent performance status and [facility] healthy life expectancy calculator predicting an additional 16.2-year life expectancy.  He is interested in definitive treatment.  He has already had gold seed fiducial placement last week in preparation for radiotherapy.  He tolerated that procedure well with no significant change in voiding.  He is completing a course of antibiotics without difficulty.      Allergies   Allergen Reactions   • Percocet [Oxycodone-Acetaminophen] Nausea Only   • Codeine Other (See Comments)     headaches   • Morphine And Related Hallucinations       Social History      Socioeconomic History   • Marital status:      Spouse name: Not on file   • Number of children: Not on file   • Years of education: Not on file   • Highest education level: Not on file   Tobacco Use   • Smoking status: Former Smoker     Packs/day: 0.50     Types: Cigarettes   • Smokeless tobacco: Former User   Substance and Sexual Activity   • Alcohol use: No   • Drug use: No   • Sexual activity: Defer       Past Medical History:   Diagnosis Date   • AAA (abdominal aortic aneurysm) (CMS/HCC)    • Arthritis    • Cancer (CMS/HCC)    • Coronary artery disease    • Elevated cholesterol    • Glaucoma    • Hypertension    • Prostate cancer (CMS/HCC)    • Skin cancer        family history includes No Known Problems in his father and mother.     Past Surgical History:   Procedure Laterality Date   • ABDOMINAL AORTIC ANEURYSM REPAIR     • ABDOMINAL AORTIC ANEURYSM REPAIR     • ABDOMINAL AORTIC ANEURYSM REPAIR     • BACK SURGERY      cervical   • CAROTID ARTERY ANGIOPLASTY     • COLONOSCOPY  05/2019   • ENDOSCOPY     • EYE SURGERY Bilateral     iol   • GALLBLADDER SURGERY     • PROSTATE FIDUCIAL MARKER PLACEMENT N/A 12/16/2019    Procedure: PROSTATE FIDUCIAL MARKER PLACEMENT WITH 6 MARKERS;  Surgeon: Tom Pineda MD;  Location: [facility];  Service: Urology        Review of Systems   Constitutional: Negative.    HENT:   Positive for hearing loss.    Eyes: Negative.    Respiratory: Negative.    Cardiovascular: Negative.    Gastrointestinal: Negative.    Endocrine: Negative.    Genitourinary: Negative.     Musculoskeletal: Negative.    Skin: Negative.    Neurological: Negative.    Hematological: Bruises/bleeds easily.   Psychiatric/Behavioral: Negative.             IPSS Questionnaire (AUA-7):  Over the past month…    1)  Incomplete Emptying  How often have you had a sensation of not emptying your bladder?  0 - Not at all   2)  Frequency  How often have you had to urinate less than every two hours? 0 - Not  at all   3)  Intermittency  How often have you found you stopped and started again several times when you urinated?  2 - Less than half the time   4) Urgency  How often have you found it difficult to postpone urination?  1 - Less than 1 time in 5   5) Weak Stream  How often have you had a weak urinary stream?  4 - More than half the time   6) Straining  How often have you had to push or strain to begin urination?  0 - Not at all   7) Nocturia  How many times did you typically get up at night to urinate?  2 - 2 times   Total Score:  9       Quality of life due to urinary symptoms:  If you were to spend the rest of your life with your urinary condition the way it is now, how would you feel about that? 4-Mostly Dissatisfied   Urine Leakage (Incontinence) 0-No Leakage     Sexual Health Inventory  Current Status    1)  How do you rate your confidence that you could achieve and keep an erection? 3-Moderate   2) When you had erections with sexual stimulation, how often were your erections hard enough for penetration (entering your partner)? 4-Most times (much more than half the time)   3)  During sexual intercourse, how often were you able to maintain your erection after you had penetrated (entered) into your partner? 5-Almost always or always   4) During sexual intercourse, how difficult was it to maintain your erection to completion of intercourse? 4-Slightly difficult   5) When you attempted sexual intercourse, how often was it satisfactory to you? 4-Most times (much more than half the time)   Total Score: 21       Bowel Health Inventory  Current Status: 0-No problems, no rectal bleeding, no discharge, less then 5 bowel movements a day                  Objective   VITAL SIGNS:   Vitals:    12/19/19 1043   BP: 162/72   Pulse: 70   Resp: 20   Temp: 97.7 °F (36.5 °C)   TempSrc: Temporal   SpO2: 96%   Weight: 78.2 kg (172 lb 6.4 oz)   PainSc: 0-No pain        Karnofsky score: 70        Physical Exam   Constitutional: He is  oriented to person, place, and time. He appears well-developed and well-nourished.   HENT:   Head: Normocephalic and atraumatic.   Neck: Normal range of motion. Neck supple.   Cardiovascular: Normal rate, regular rhythm and normal heart sounds.   No murmur heard.  Pulmonary/Chest: Effort normal and breath sounds normal. He has no wheezes. He has no rales.   Abdominal: Soft. Bowel sounds are normal. He exhibits no distension. There is no hepatosplenomegaly. There is no tenderness.   Genitourinary: Rectal exam shows no external hemorrhoid, no internal hemorrhoid, no fissure, no mass, no tenderness and anal tone normal. Prostate is enlarged ( 25 to 30 cc, symmetric but with slight induration of nodule left lobe.  Seminal vesicles not palpable.  Anterior rectal wall has irregular mobile thickening after recent procedures.). Prostate is not tender.   Musculoskeletal: Normal range of motion. He exhibits no edema or tenderness.   Lymphadenopathy:     He has no cervical adenopathy.     He has no axillary adenopathy.        Right: No supraclavicular adenopathy present.        Left: No supraclavicular adenopathy present.   Neurological: He is alert and oriented to person, place, and time. He has normal strength. No sensory deficit.   Skin: Skin is warm and dry.   Psychiatric: He has a normal mood and affect. His behavior is normal. Judgment and thought content normal.   Nursing note and vitals reviewed.           The following portions of the patient's history were reviewed and updated as appropriate: allergies, current medications, past family history, past medical history, past social history, past surgical history and problem list.    Assessment      Prostate cancer, Tamie score 3+4 = 7, clinical stage IIB (T2c, N0, M0), PSA 10.1 ng/mL.  He has intermediate risk disease, yet prognosis should be very good.  Definitive treatment would be recommended based on tumor grade, number of positive biopsies and presence of  perineural invasion.  He is healthy enough for definitive treatment.  The radiation treatment options of IMRT, SBRT and brachytherapy were discussed.  Patient would like to proceed with stereotactic body radiotherapy.  He is an excellent candidate for that modality and already has gold seed fiducials placed.  Orthogonal imaging on the CyberKnife treatment couch today shows presence of 6 gold seed fiducials with adequate spacing for tracking with correction for rotations.  Informed consent for treatment was obtained today.      RECOMMENDATIONS: He will return next week for CT simulation and MRI pelvis.  The prostate gland and proximal seminal vesicles will receive 5 fractions of 7 Gy each on the CyberKnife.    Return in about 1 week (around 12/26/2019) for Simulation.  Celso was seen today for prostate cancer.    Diagnoses and all orders for this visit:    Prostate cancer (CMS/HCC)  -     MRI Cyberknife Pelvis Without Contrast; Future    Other orders  -     tamsulosin (FLOMAX) 0.4 MG capsule 24 hr capsule; Take 1 capsule by mouth Every Night.         Jin Jaeger MD

## 2019-12-20 ENCOUNTER — DOCUMENTATION (OUTPATIENT)
Dept: NUTRITION | Facility: HOSPITAL | Age: 84
End: 2019-12-20

## 2019-12-20 NOTE — PROGRESS NOTES
ONC Nutrition    Phone consult with patient reviewing the Gas Elimination Diet and instructions as he prepares for the imaging scans 12/26/19 and CK treatment for prostate cancer.  Excellent comprehension verbalized; all questions addressed.

## 2019-12-26 ENCOUNTER — OFFICE VISIT (OUTPATIENT)
Dept: RADIATION ONCOLOGY | Facility: HOSPITAL | Age: 84
End: 2019-12-26

## 2019-12-26 ENCOUNTER — HOSPITAL ENCOUNTER (OUTPATIENT)
Dept: RADIATION ONCOLOGY | Facility: HOSPITAL | Age: 84
Discharge: HOME | End: 2019-12-26

## 2019-12-26 ENCOUNTER — HOSPITAL ENCOUNTER (OUTPATIENT)
Dept: MRI IMAGING | Facility: HOSPITAL | Age: 84
Discharge: HOME | End: 2019-12-26
Admitting: RADIOLOGY

## 2019-12-26 VITALS
SYSTOLIC BLOOD PRESSURE: 113 MMHG | WEIGHT: 167.3 LBS | OXYGEN SATURATION: 94 % | DIASTOLIC BLOOD PRESSURE: 54 MMHG | TEMPERATURE: 96.4 F | RESPIRATION RATE: 20 BRPM | BODY MASS INDEX: 25.44 KG/M2 | HEART RATE: 68 BPM

## 2019-12-26 DIAGNOSIS — C61 PROSTATE CANCER (HCC): Primary | ICD-10-CM

## 2019-12-26 DIAGNOSIS — C61 PROSTATE CANCER (HCC): ICD-10-CM

## 2019-12-26 PROCEDURE — 72195 MRI PELVIS W/O DYE: CPT

## 2019-12-26 PROCEDURE — G0463 HOSPITAL OUTPT CLINIC VISIT: HCPCS

## 2019-12-26 PROCEDURE — 77280 THER RAD SIMULAJ FIELD SMPL: CPT | Performed by: RADIOLOGY

## 2019-12-26 RX ORDER — TAMSULOSIN HYDROCHLORIDE 0.4 MG/1
1 CAPSULE ORAL DAILY
COMMUNITY
End: 2020-09-29 | Stop reason: SDUPTHER

## 2019-12-26 NOTE — PROGRESS NOTES
RE-EVALUATION    PATIENT:                                                      Celso Mcelroy  :                                                          1935  DATE:                          2019   DIAGNOSIS:     Prostate cancer (CMS/[facility])  - Stage IIB (cT2c, cN0, cM0, PSA: 10.1, Grade Group: 2)         BRIEF HISTORY:  The patient is a very pleasant 83 y.o. male  with intermediate risk prostate cancer.  He chose to undergo definitive treatment with stereotactic body radiotherapy.  He returned today for simulation in preparation for treatment.  He has had no other change in health since informed consent was obtained on 2019 and remains a good candidate for SBRT.    Allergies   Allergen Reactions   • Percocet [Oxycodone-Acetaminophen] Nausea Only   • Codeine Other (See Comments)     headaches   • Morphine And Related Hallucinations       Review of Systems   Constitutional: Negative.    HENT:   Positive for hearing loss.    Eyes: Negative.    Respiratory: Negative.    Cardiovascular: Negative.    Gastrointestinal: Negative.    Endocrine: Negative.    Genitourinary: Negative.     Musculoskeletal: Negative.    Skin: Negative.    Neurological: Negative.    Hematological: Negative.    Psychiatric/Behavioral: Negative.               IPSS Questionnaire (AUA-7):  Over the past month…     1)  Incomplete Emptying  How often have you had a sensation of not emptying your bladder?  0 - Not at all   2)  Frequency  How often have you had to urinate less than every two hours? 0 - Not at all   3)  Intermittency  How often have you found you stopped and started again several times when you urinated?  2 - Less than half the time   4) Urgency  How often have you found it difficult to postpone urination?  1 - Less than 1 time in 5   5) Weak Stream  How often have you had a weak urinary stream?  4 - More than half the time   6) Straining  How often have you had to push or strain to begin urination?  0 - Not at all   7)  Nocturia  How many times did you typically get up at night to urinate?  2 - 2 times   Total Score:  9         Quality of life due to urinary symptoms:  If you were to spend the rest of your life with your urinary condition the way it is now, how would you feel about that? 4-Mostly Dissatisfied   Urine Leakage (Incontinence) 0-No Leakage      Sexual Health Inventory  Current Status     1)  How do you rate your confidence that you could achieve and keep an erection? 3-Moderate   2) When you had erections with sexual stimulation, how often were your erections hard enough for penetration (entering your partner)? 4-Most times (much more than half the time)   3)  During sexual intercourse, how often were you able to maintain your erection after you had penetrated (entered) into your partner? 5-Almost always or always   4) During sexual intercourse, how difficult was it to maintain your erection to completion of intercourse? 4-Slightly difficult   5) When you attempted sexual intercourse, how often was it satisfactory to you? 4-Most times (much more than half the time)   Total Score: 21         Bowel Health Inventory  Current Status: 0-No problems, no rectal bleeding, no discharge, less then 5 bowel movements a day                  Objective   VITAL SIGNS:   Vitals:    12/26/19 0939   BP: 113/54   Pulse: 68   Resp: 20   Temp: 96.4 °F (35.8 °C)   TempSrc: Temporal   SpO2: 94%   Weight: 75.9 kg (167 lb 4.8 oz)   PainSc: 0-No pain        KPS 80%    Physical Exam   Constitutional: He is oriented to person, place, and time. He appears well-developed and well-nourished.   Neck: Normal range of motion. Neck supple.   Cardiovascular: Normal rate and regular rhythm.   Pulmonary/Chest: Effort normal.   Musculoskeletal: Normal range of motion.   Neurological: He is alert and oriented to person, place, and time.   Skin: Skin is warm and dry.   Nursing note and vitals reviewed.           The following portions of the patient's history  were reviewed and updated as appropriate: allergies, current medications, past family history, past medical history, past social history, past surgical history and problem list.    Diagnoses and all orders for this visit:    Prostate cancer (CMS/HCC)    Other orders  -     tamsulosin (FLOMAX) 0.4 MG capsule 24 hr capsule; Take 1 capsule by mouth Daily.      IMPRESSION:  Prostate cancer, Tamie score 3+4 = 7, clinical stage IIB (T2c, N0, M0), PSA 10.1 ng/mL.    RECOMMENDATIONS: Treat planning CT and MRI pelvis will be performed today.  The prostate gland and proximal seminal vesicles will receive 5 fractions of 7 Gy each on the CyberKnife.         Jin Jaeger MD

## 2020-01-02 ENCOUNTER — HOSPITAL ENCOUNTER (OUTPATIENT)
Dept: RADIATION ONCOLOGY | Facility: HOSPITAL | Age: 85
Setting detail: RADIATION/ONCOLOGY SERIES
Discharge: HOME | End: 2020-01-02

## 2020-01-02 PROCEDURE — 77300 RADIATION THERAPY DOSE PLAN: CPT | Performed by: RADIOLOGY

## 2020-01-02 PROCEDURE — 77334 RADIATION TREATMENT AID(S): CPT | Performed by: RADIOLOGY

## 2020-01-02 PROCEDURE — 77295 3-D RADIOTHERAPY PLAN: CPT | Performed by: RADIOLOGY

## 2020-01-06 ENCOUNTER — DOCUMENTATION (OUTPATIENT)
Dept: SOCIAL WORK | Facility: HOSPITAL | Age: 85
End: 2020-01-06

## 2020-01-06 NOTE — PROGRESS NOTES
SW completed and faxed in a [place] referral for pt for 1/12-1/17.  SW available for ongoing support and resource needs.

## 2020-01-13 ENCOUNTER — HOSPITAL ENCOUNTER (OUTPATIENT)
Dept: RADIATION ONCOLOGY | Facility: HOSPITAL | Age: 85
Discharge: HOME | End: 2020-01-13

## 2020-01-13 PROCEDURE — 77280 THER RAD SIMULAJ FIELD SMPL: CPT | Performed by: RADIOLOGY

## 2020-01-13 PROCEDURE — 77373 STRTCTC BDY RAD THER TX DLVR: CPT | Performed by: RADIOLOGY

## 2020-01-14 ENCOUNTER — HOSPITAL ENCOUNTER (OUTPATIENT)
Dept: RADIATION ONCOLOGY | Facility: HOSPITAL | Age: 85
Discharge: HOME | End: 2020-01-14

## 2020-01-14 PROCEDURE — 77373 STRTCTC BDY RAD THER TX DLVR: CPT | Performed by: RADIOLOGY

## 2020-01-15 ENCOUNTER — HOSPITAL ENCOUNTER (OUTPATIENT)
Dept: RADIATION ONCOLOGY | Facility: HOSPITAL | Age: 85
Discharge: HOME | End: 2020-01-15

## 2020-01-15 PROCEDURE — 77373 STRTCTC BDY RAD THER TX DLVR: CPT | Performed by: RADIOLOGY

## 2020-01-16 ENCOUNTER — HOSPITAL ENCOUNTER (OUTPATIENT)
Dept: RADIATION ONCOLOGY | Facility: HOSPITAL | Age: 85
Discharge: HOME | End: 2020-01-16

## 2020-01-16 PROCEDURE — 77373 STRTCTC BDY RAD THER TX DLVR: CPT | Performed by: RADIOLOGY

## 2020-01-17 ENCOUNTER — HOSPITAL ENCOUNTER (OUTPATIENT)
Dept: RADIATION ONCOLOGY | Facility: HOSPITAL | Age: 85
Discharge: HOME | End: 2020-01-17

## 2020-01-17 PROCEDURE — 77336 RADIATION PHYSICS CONSULT: CPT | Performed by: RADIOLOGY

## 2020-01-17 PROCEDURE — 77373 STRTCTC BDY RAD THER TX DLVR: CPT | Performed by: RADIOLOGY

## 2020-02-17 ENCOUNTER — OFFICE VISIT (OUTPATIENT)
Dept: RADIATION ONCOLOGY | Facility: HOSPITAL | Age: 85
End: 2020-02-17

## 2020-02-17 ENCOUNTER — HOSPITAL ENCOUNTER (OUTPATIENT)
Dept: RADIATION ONCOLOGY | Facility: HOSPITAL | Age: 85
Setting detail: RADIATION/ONCOLOGY SERIES
Discharge: HOME | End: 2020-02-17

## 2020-02-17 VITALS
OXYGEN SATURATION: 96 % | SYSTOLIC BLOOD PRESSURE: 143 MMHG | HEIGHT: 68 IN | BODY MASS INDEX: 25.93 KG/M2 | TEMPERATURE: 97.1 F | HEART RATE: 61 BPM | RESPIRATION RATE: 16 BRPM | WEIGHT: 171.1 LBS | DIASTOLIC BLOOD PRESSURE: 65 MMHG

## 2020-02-17 DIAGNOSIS — N52.9 ERECTILE DYSFUNCTION, UNSPECIFIED ERECTILE DYSFUNCTION TYPE: ICD-10-CM

## 2020-02-17 DIAGNOSIS — C61 PROSTATE CANCER (HCC): Primary | ICD-10-CM

## 2020-02-17 PROCEDURE — G0463 HOSPITAL OUTPT CLINIC VISIT: HCPCS

## 2020-02-17 RX ORDER — SILDENAFIL 25 MG/1
25 TABLET, FILM COATED ORAL AS NEEDED
Qty: 30 TABLET | Refills: 5 | Status: SHIPPED | OUTPATIENT
Start: 2020-02-17 | End: 2021-11-18 | Stop reason: SDUPTHER

## 2020-02-17 NOTE — PROGRESS NOTES
FOLLOW UP NOTE    PATIENT:                                                      Celso Mcelroy  MEDICAL RECORD #:                        [id number]  :                                                          1935  COMPLETION DATE:    2020  DIAGNOSIS:     Prostate cancer (CMS/[facility])  - Stage IIB (cT2c, cN0, cM0, PSA: 10.1, Grade Group: 2)      BRIEF HISTORY:    Initial follow-up visit.  History of intermediate risk prostate cancer, treated definitively with CyberKnife SBRT.  He experienced a 1-week duration of increased urinary frequency and urgency following treatment.    Urinary symptoms have since returned to baseline, with only complaint of nocturia.    He has a history of chronic moderate urinary outflow obstructive symptoms.  He remains on Flomax nightly.  He denies bowel complaints.  He experienced no change in energy level, and continued to exercise daily throughout treatment.  Overall he feels well.  His chief concern pertains to erectile dysfunction.  He has not been sexually active in 3 years, but would like to attempt intercourse.  He has used Viagra in the past with much success.  He has a history of AAA () and hypertension.  He denies angina, use of nitrates, shortness of air, hypotensive episodes, or acute cardiac events.  He has not yet had urologic evaluation or repeat PSA testing.      MEDICATIONS: Medication reconciliation for the patient was reviewed and confirmed in the electronic medical record.    Review of Systems   HENT:   Positive for hearing loss.         (wears hearing aids)   Genitourinary: Positive for nocturia.    Musculoskeletal: Positive for arthralgias.   All other systems reviewed and are negative.      KPS 90%    IPSS Questionnaire (AUA-7):  Over the past month…    1)  Incomplete Emptying  How often have you had a sensation of not emptying your bladder?  1 - Less than 1 time in 5   2)  Frequency  How often have you had to urinate less than every two hours? 1 - Less  than 1 time in 5   3)  Intermittency  How often have you found you stopped and started again several times when you urinated?  2 - Less than half the time   4) Urgency  How often have you found it difficult to postpone urination?  1 - Less than 1 time in 5   5) Weak Stream  How often have you had a weak urinary stream?  3 - About half the time   6) Straining  How often have you had to push or strain to begin urination?  0 - Not at all   7) Nocturia  How many times did you typically get up at night to urinate?  3 - 3 times   Total Score:  11       Quality of life due to urinary symptoms:  If you were to spend the rest of your life with your urinary condition the way it is now, how would you feel about that? 3-Mixed   Urine Leakage (Incontinence) 0-No Leakage     Sexual Health Inventory  Current Status    1)  How do you rate your confidence that you could achieve and keep an erection? 1-Very Low   2) When you had erections with sexual stimulation, how often were your erections hard enough for penetration (entering your partner)? 0-No sexual activity   3)  During sexual intercourse, how often were you able to maintain your erection after you had penetrated (entered) into your partner? 0-Did not attempt intercourse   4) During sexual intercourse, how difficult was it to maintain your erection to completion of intercourse? 0-Did not attempt intercourse   5) When you attempted sexual intercourse, how often was it satisfactory to you? 0-No sexual activity   Total Score: 1       Bowel Health Inventory  Current Status: 0-No problems, no rectal bleeding, no discharge, less then 5 bowel movements a day         Physical Exam   Constitutional: He is oriented to person, place, and time. He appears well-developed and well-nourished. No distress.   HENT:   Head: Normocephalic and atraumatic.   Eyes: Pupils are equal, round, and reactive to light. Conjunctivae are normal.   Neck: Normal range of motion. Neck supple.  "  Cardiovascular: Normal rate and regular rhythm.   No murmur heard.  Pulmonary/Chest: Effort normal and breath sounds normal. He has no wheezes.   Abdominal: Soft. Bowel sounds are normal. He exhibits no distension and no mass. There is no tenderness.   Musculoskeletal: Normal range of motion. He exhibits no edema.   Lymphadenopathy:     He has no cervical adenopathy.        Right: No supraclavicular adenopathy present.        Left: No supraclavicular adenopathy present.   Neurological: He is alert and oriented to person, place, and time.   Skin: Skin is warm and dry.   Psychiatric: He has a normal mood and affect. His behavior is normal. Judgment and thought content normal.   Nursing note and vitals reviewed.      VITAL SIGNS:   Vitals:    02/17/20 1359   BP: 143/65   Pulse: 61   Resp: 16   Temp: 97.1 °F (36.2 °C)   TempSrc: Temporal   SpO2: 96%  Comment: RA   Weight: 77.6 kg (171 lb 1.6 oz)   Height: 172.7 cm (68\")   PainSc: 0-No pain       The following portions of the patient's history were reviewed and updated as appropriate: allergies, current medications, past family history, past medical history, past social history, past surgical history and problem list.         Celso was seen today for prostate cancer.    Diagnoses and all orders for this visit:    Prostate cancer (CMS/[facility])    Erectile dysfunction (CMS/[facility])   - Sildenafil (Viagra) 25 mg Take 1 tablet by mouth As Needed for Erectile Dysfunction. May take up to 4 tabs daily. Do not exceed 100 mg in a day. Take 30-60 minutes prior to intercourse.      IMPRESSION:  Prostate cancer, Greene score 3+4 = 7, clinical stage IIB (T2c, N0, M0), pre-treatment PSA 10.1 ng/mL.  He completed CyberKnife SBRT 1 month ago.  He tolerated treatment well.  No evidence of radiation-related toxicities.  We discussed taper/discontinuation of Flomax, as tolerated.  I will send prescription for sildenafil.  We reviewed safety precautions and side effects of medication, including " the risk for hypotension when taken with alpha-blocker.  I strongly encouraged him to begin with the lowest dose and titrate upwards as needed, not to exceed 100 mg in a day.  He is scheduled for urologic follow-up and PSA testing on 4/15/2020.      The patient and I have reviewed the survivorship care plan in detail.  We discussed diagnosis, response to treatment, and ongoing surveillance recommendations with PSA and follow-up intervals.  All questions were answered to his satisfaction.  A copy of the survivorship care plan was provided at the completion of our visit.  A copy has also been sent to the patient's primary care provider.        RECOMMENDATIONS: Mr. Mcelroy plans to remain under the urologic surveillance of Dr. Pineda.  Return in about 6 months (around 8/17/2020) for Office Visit.    Marin Carmona, APRN

## 2020-04-20 ENCOUNTER — TELEPHONE (OUTPATIENT)
Dept: UROLOGY | Facility: CLINIC | Age: 85
End: 2020-04-20

## 2020-04-20 NOTE — TELEPHONE ENCOUNTER
Spoke to pt- he is living in an assisted living facility that has them on lock down and if the pt comes out for any reason, he has to be in quarantine for 14 days after. I explained that we will have the telephone visit with him and then have him do the blood work after this pandemic is over.

## 2020-04-20 NOTE — TELEPHONE ENCOUNTER
Pt changed his appointment to a telephone visit but he is not able to get his PSA drawn due to living in [facility] and they are on lock down.  He would like someone to call him back about what he should do.

## 2020-04-20 NOTE — PROGRESS NOTES
"Marlin Lopez is a 51 year old female who is being evaluated via a billable telephone visit.      The patient has been notified of following:     \"This telephone visit will be conducted via a call between you and your physician/provider. We have found that certain health care needs can be provided without the need for a physical exam.  This service lets us provide the care you need with a short phone conversation.  If a prescription is necessary we can send it directly to your pharmacy.  If lab work is needed we can place an order for that and you can then stop by our lab to have the test done at a later time.    Telephone visits are billed at different rates depending on your insurance coverage. During this emergency period, for some insurers they may be billed the same as an in-person visit.  Please reach out to your insurance provider with any questions.    If during the course of the call the physician/provider feels a telephone visit is not appropriate, you will not be charged for this service.\"    Patient has given verbal consent for Telephone visit?  Yes    How would you like to obtain your AVS? E-Mail (inform patient AVS not encrypted)    Subjective     Marlin Lopez is a 51 year old female who presents to clinic today for the following health issues:    ENT Symptoms             Symptoms: cc Present Absent Comment   Fever/Chills       Fatigue       Muscle Aches       Eye Irritation       Sneezing       Nasal Kenroy/Drg       Sinus Pressure/Pain       Loss of smell       Dental pain       Sore Throat  x     Swollen Glands       Ear Pain/Fullness  x  Right ear   Cough       Wheeze       Chest Pain       Shortness of breath       Rash       Other  x  Headache temporal      Symptom duration:  2 days   Symptom severity:  7   Treatments tried:  warm salt water   Contacts:  grand-daughter Dx'd with impetigo     Grand daughter had impetigo; been with them for 4 weeks  Sore throat on Rt side and Rt ear pain; " Chief Complaint:          To discuss pathology.    HPI:   82 y.o. male.  Pt had a palpable left lateral prostate nodule and psa of 5.56.  Prostate volume was 34 cc.Pt had 4 cores of 12 positive for prostate cancer grade 3+3=6.  HPI      Past Medical History:        Past Medical History:   Diagnosis Date   • AAA (abdominal aortic aneurysm)          Current Meds:     Current Outpatient Prescriptions   Medication Sig Dispense Refill   • acetaminophen (TYLENOL) 650 MG 8 hr tablet Take 650 mg by mouth Every 8 (Eight) Hours As Needed for mild pain (1-3).     • ALPRAZolam (XANAX) 0.5 MG tablet Bring to office for procedure. Staff will instruct on dose and timing. 3 tablet 0   • Brinzolamide-Brimonidine (SIMBRINZA) 1-0.2 % suspension Apply  to eye.     • chlorthalidone (HYGROTEN) 25 MG tablet Take 25 mg by mouth Daily.     • fenofibrate (TRICOR) 145 MG tablet Take 145 mg by mouth Daily.     • levoFLOXacin (LEVAQUIN) 500 MG tablet Take 1 tablet by mouth daily for three days starting the day prior to procedure. 3 tablet 0   • Omega-3 Fatty Acids (FISH OIL) 1000 MG capsule capsule Take 1,000 mg by mouth Daily With Breakfast.     • simvastatin (ZOCOR) 20 MG tablet Take 20 mg by mouth Every Night.       No current facility-administered medications for this visit.         Allergies:      Allergies   Allergen Reactions   • Codeine    • Morphine And Related    • Percocet [Oxycodone-Acetaminophen]         Past Surgical History:     Past Surgical History:   Procedure Laterality Date   • ABDOMINAL AORTIC ANEURYSM REPAIR     • BACK SURGERY     • CARDIAC SURGERY     • GALLBLADDER SURGERY           Social History:     Social History     Social History   • Marital status: Unknown     Spouse name: N/A   • Number of children: N/A   • Years of education: N/A     Occupational History   • Not on file.     Social History Main Topics   • Smoking status: Former Smoker   • Smokeless tobacco: Never Used   • Alcohol use No   • Drug use: No   •  Sexual activity: Not on file     Other Topics Concern   • Not on file     Social History Narrative   • No narrative on file       Family History:     History reviewed. No pertinent family history.    Review of Systems:     Review of Systems   Constitutional: Negative for chills, diaphoresis and fatigue.   HENT: Negative for congestion and sinus pressure.    Respiratory: Negative for shortness of breath.    Cardiovascular: Negative for chest pain.   Gastrointestinal: Negative for abdominal pain, constipation, diarrhea, nausea and vomiting.   Genitourinary: Negative for frequency and urgency.   Musculoskeletal: Negative for back pain and neck pain.   Neurological: Negative for dizziness and headaches.   Hematological: Does not bruise/bleed easily.   Psychiatric/Behavioral: The patient is not nervous/anxious.            I have reviewed the follow portions of the patient's history and confirmed they are accurate today:  allergies, current medications, past family history, past medical history, past social history, past surgical history, problem list and ROS  Physical Exam:     Physical Exam   Constitutional: He is oriented to person, place, and time.   HENT:   Head: Normocephalic and atraumatic.   Right Ear: External ear normal.   Left Ear: External ear normal.   Nose: Nose normal.   Mouth/Throat: Oropharynx is clear and moist.   Eyes: Conjunctivae and EOM are normal. Pupils are equal, round, and reactive to light.   Neck: Normal range of motion. Neck supple. No thyromegaly present.   Cardiovascular: Normal rate, regular rhythm, normal heart sounds and intact distal pulses.    No murmur heard.  Pulmonary/Chest: Effort normal and breath sounds normal. No respiratory distress. He has no wheezes. He has no rales. He exhibits no tenderness.   Abdominal: Soft. Bowel sounds are normal. He exhibits no distension and no mass. There is no tenderness. No hernia.   Genitourinary: Rectum normal, prostate normal and penis normal.  started about 2 days ago.  Been doing salt water.   Also took nyquil.     Assessment/Plan:  1. Sore Throat  Discussed differentials including viral or streptococcal sore throat.  Patient worried and does not want this to get worse discussed doing an empiric treatment for sore throat.  - azithromycin (ZITHROMAX) 250 MG tablet; Take 2 tablets (500 mg) by mouth daily for 1 day, THEN 1 tablet (250 mg) daily for 4 days.  Dispense: 6 tablet; Refill: 0    2. Otalgia, right  Possible extension of pain from the sore throat through the eustachian tube.  - azithromycin (ZITHROMAX) 250 MG tablet; Take 2 tablets (500 mg) by mouth daily for 1 day, THEN 1 tablet (250 mg) daily for 4 days.  Dispense: 6 tablet; Refill: 0    2:00 PM  2:13 PM    Phone call duration:  13 minutes    Zach Corona MD       "  Genitourinary Comments: Nodule on left base   Musculoskeletal: Normal range of motion. He exhibits no edema or tenderness.   Lymphadenopathy:     He has no cervical adenopathy.   Neurological: He is alert and oriented to person, place, and time. No cranial nerve deficit. He exhibits normal muscle tone. Coordination normal.   Skin: Skin is warm. No rash noted.   Psychiatric: He has a normal mood and affect. His behavior is normal. Judgment and thought content normal.   Nursing note and vitals reviewed.      Ht 170.2 cm (67.01\")   Wt 78 kg (172 lb)   BMI 26.93 kg/m²    Procedure:         Assessment:     Encounter Diagnosis   Name Primary?   • Prostate cancer Yes     No orders of the defined types were placed in this encounter.      Plan:   I discussed the frequency of prostate with age and I discussed treatment vs observation    Patient's Body mass index is 26.93 kg/m². BMI is within normal parameters. No follow-up required.      Counseling was given to patient for the following topics diagnostic results including: pathology. The interim medical history and current results were reviewed.  A treatment plan with follow-up was made for Prostate cancer [C61]. I spent 65 minutes face to face with Celso Mcelroy and 80 percentage was spent in counseling.    This document has been electronically signed by Tom Pineda MD May 30, 2018 4:40 PM  "

## 2020-04-30 ENCOUNTER — OFFICE VISIT (OUTPATIENT)
Dept: UROLOGY | Facility: CLINIC | Age: 85
End: 2020-04-30

## 2020-04-30 VITALS — WEIGHT: 168 LBS | HEIGHT: 68 IN | BODY MASS INDEX: 25.46 KG/M2

## 2020-04-30 DIAGNOSIS — C61 PROSTATE CANCER (HCC): Primary | ICD-10-CM

## 2020-04-30 PROCEDURE — 99441 PR PHYS/QHP TELEPHONE EVALUATION 5-10 MIN: CPT | Performed by: UROLOGY

## 2020-04-30 NOTE — PROGRESS NOTES
Chief Complaint:           prostate cancer    HPI:   84 y.o. male.  Pt is on aggressive surveillance for 2 years for prostate cancer.  He is sexually active.  His psa this month is 10.1.  Pt had a palpable left lateral prostate nodule and psa 6 months ago was 7.7.  Prostate volume was 34 cc.Pt had 4 cores of 12 positive for prostate cancer grade.   He has 3 of 63cores positive for prostate cancer grade 3 and 5 in 20% of each core.on the left side and one core of 3 on the right side had 5%  Patient finished his radiation therapy in January and has not had a PSA drawn since.  The patient is in his senior assisted living setting and during this pandemic there are no absolute lockdown.  We will try to get a PSA drawn.  HPI  The patient I spent 5 minutes on the phone visit   The phone visit was required because of the current pandemic   you have chosen to receive care through a telephone visit. Do you consent to use a telephone visit for your medical care today? Yes  Past Medical History:        Past Medical History:   Diagnosis Date   • AAA (abdominal aortic aneurysm) (CMS/HCC)    • Arthritis    • Cancer (CMS/HCC)    • Coronary artery disease    • Elevated cholesterol    • Glaucoma    • Hypertension    • Prostate cancer (CMS/HCC)    • Skin cancer          Current Meds:     Current Outpatient Medications   Medication Sig Dispense Refill   • acetaminophen (TYLENOL) 650 MG 8 hr tablet Take 650 mg by mouth Every 8 (Eight) Hours As Needed for mild pain (1-3).     • amLODIPine (NORVASC) 2.5 MG tablet 2.5 mg Every Evening.     • Brinzolamide-Brimonidine (SIMBRINZA) 1-0.2 % suspension Apply  to eye.     • fenofibrate (TRICOR) 145 MG tablet Take 145 mg by mouth Daily.     • sildenafil (VIAGRA) 25 MG tablet Take 1 tablet by mouth As Needed for Erectile Dysfunction. May take up to 4 tabs daily. Do not exceed 100 mg in a day. Take 30-60 minutes prior to intercourse 30 tablet 5   • simvastatin (ZOCOR) 20 MG tablet Take 20 mg by  "mouth Every Night.     • tamsulosin (FLOMAX) 0.4 MG capsule 24 hr capsule Take 1 capsule by mouth Daily.     • TRAVATAN Z 0.004 % solution ophthalmic solution As Needed.     • valsartan (DIOVAN) 320 MG tablet Take 320 mg by mouth.       No current facility-administered medications for this visit.         Allergies:      Allergies   Allergen Reactions   • Percocet [Oxycodone-Acetaminophen] Nausea Only   • Codeine Other (See Comments)     headaches   • Morphine And Related Hallucinations        Past Surgical History:     Past Surgical History:   Procedure Laterality Date   • ABDOMINAL AORTIC ANEURYSM REPAIR     • ABDOMINAL AORTIC ANEURYSM REPAIR     • ABDOMINAL AORTIC ANEURYSM REPAIR     • BACK SURGERY      cervical   • CAROTID ARTERY ANGIOPLASTY     • COLONOSCOPY  05/2019   • CYBERKNIFE  01/17/2020    Prostate   • ENDOSCOPY     • EYE SURGERY Bilateral     iol   • GALLBLADDER SURGERY     • PROSTATE FIDUCIAL MARKER PLACEMENT N/A 12/16/2019    Procedure: PROSTATE FIDUCIAL MARKER PLACEMENT WITH 6 MARKERS;  Surgeon: Tom Pineda MD;  Location: [facility];  Service: Urology         Social History:     Social History     Socioeconomic History   • Marital status:      Spouse name: Not on file   • Number of children: Not on file   • Years of education: Not on file   • Highest education level: Not on file   Tobacco Use   • Smoking status: Former Smoker     Packs/day: 0.50     Types: Cigarettes   • Smokeless tobacco: Former User   Substance and Sexual Activity   • Alcohol use: No   • Drug use: No   • Sexual activity: Defer       Family History:     Family History   Problem Relation Age of Onset   • No Known Problems Father    • No Known Problems Mother        Review of Systems:       Physical Exam:     Physical Exam    Ht 172.7 cm (68\")   Wt 76.2 kg (168 lb)   BMI 25.54 kg/m²    Procedure:         Assessment:     Encounter Diagnosis   Name Primary?   • Prostate cancer (CMS/HCC) Yes       Orders Placed This " Encounter   Procedures   • PSA DIAGNOSTIC     Standing Status:   Future     Standing Expiration Date:   4/30/2021       Patient reports that he is not currently experiencing any symptoms of urinary incontinence.      Plan:   We will see him back in September after he has had a chance to see Dr. Julien in August and we will check a PSA at that time and compare that one that will be drawn this week     This document has been electronically signed by Tom Pineda MD April 30, 2020 14:21

## 2020-07-20 ENCOUNTER — TELEPHONE (OUTPATIENT)
Dept: UROLOGY | Facility: CLINIC | Age: 85
End: 2020-07-20

## 2020-07-20 NOTE — TELEPHONE ENCOUNTER
----- Message from Chelsea Ruby sent at 7/20/2020  9:14 AM EDT -----  Regarding: LAB ORDERS  Patient request that lab order be faxed to [facility] in [place].    Thank you,  Chelsea

## 2020-07-29 ENCOUNTER — PRIOR AUTHORIZATION (OUTPATIENT)
Dept: UROLOGY | Facility: CLINIC | Age: 85
End: 2020-07-29

## 2020-09-29 ENCOUNTER — OFFICE VISIT (OUTPATIENT)
Dept: RADIATION ONCOLOGY | Facility: HOSPITAL | Age: 85
End: 2020-09-29

## 2020-09-29 ENCOUNTER — HOSPITAL ENCOUNTER (OUTPATIENT)
Dept: RADIATION ONCOLOGY | Facility: HOSPITAL | Age: 85
Setting detail: RADIATION/ONCOLOGY SERIES
Discharge: HOME | End: 2020-09-29

## 2020-09-29 DIAGNOSIS — C61 PROSTATE CANCER (HCC): Primary | ICD-10-CM

## 2020-09-29 RX ORDER — TAMSULOSIN HYDROCHLORIDE 0.4 MG/1
1 CAPSULE ORAL DAILY
Qty: 30 CAPSULE | Refills: 11 | Status: SHIPPED | OUTPATIENT
Start: 2020-09-29 | End: 2021-11-18 | Stop reason: ALTCHOICE

## 2021-03-05 ENCOUNTER — HOSPITAL ENCOUNTER (EMERGENCY)
Dept: HOSPITAL 79 - ER1 | Age: 86
LOS: 1 days | Discharge: HOME | End: 2021-03-06
Payer: MEDICARE

## 2021-03-05 DIAGNOSIS — Z88.5: ICD-10-CM

## 2021-03-05 DIAGNOSIS — R07.9: ICD-10-CM

## 2021-03-05 DIAGNOSIS — E78.5: ICD-10-CM

## 2021-03-05 DIAGNOSIS — R10.817: Primary | ICD-10-CM

## 2021-03-05 DIAGNOSIS — I10: ICD-10-CM

## 2021-03-05 DIAGNOSIS — Z88.8: ICD-10-CM

## 2021-03-05 LAB
BUN/CREATININE RATIO: 27 (ref 0–10)
HGB BLD-MCNC: 13.2 GM/DL (ref 14–17.5)
RED BLOOD COUNT: 4.08 M/UL (ref 4.2–5.5)
WHITE BLOOD COUNT: 6.4 K/UL (ref 4.5–11)

## 2021-03-16 ENCOUNTER — TRANSCRIBE ORDERS (OUTPATIENT)
Dept: ADMINISTRATIVE | Facility: HOSPITAL | Age: 86
End: 2021-03-16

## 2021-03-16 DIAGNOSIS — Z01.818 PRE-OPERATIVE CLEARANCE: Primary | ICD-10-CM

## 2021-04-13 ENCOUNTER — OFFICE VISIT (OUTPATIENT)
Dept: CARDIOLOGY | Facility: CLINIC | Age: 86
End: 2021-04-13

## 2021-04-13 VITALS
BODY MASS INDEX: 26.21 KG/M2 | RESPIRATION RATE: 16 BRPM | DIASTOLIC BLOOD PRESSURE: 68 MMHG | TEMPERATURE: 96.7 F | SYSTOLIC BLOOD PRESSURE: 152 MMHG | HEIGHT: 67 IN | OXYGEN SATURATION: 98 % | HEART RATE: 61 BPM | WEIGHT: 167 LBS

## 2021-04-13 DIAGNOSIS — Z85.46 HISTORY OF PROSTATE CANCER: ICD-10-CM

## 2021-04-13 DIAGNOSIS — I73.9 CLAUDICATION OF LEFT LOWER EXTREMITY (HCC): ICD-10-CM

## 2021-04-13 DIAGNOSIS — I71.40 AAA (ABDOMINAL AORTIC ANEURYSM) WITHOUT RUPTURE (HCC): ICD-10-CM

## 2021-04-13 DIAGNOSIS — R07.9 CHEST PAIN, UNSPECIFIED TYPE: Primary | ICD-10-CM

## 2021-04-13 DIAGNOSIS — R00.2 PALPITATIONS: ICD-10-CM

## 2021-04-13 DIAGNOSIS — M79.605 PAIN OF LEFT LOWER EXTREMITY: ICD-10-CM

## 2021-04-13 PROCEDURE — 93246 EXT ECG>7D<15D RECORDING: CPT | Performed by: INTERNAL MEDICINE

## 2021-04-13 PROCEDURE — 99214 OFFICE O/P EST MOD 30 MIN: CPT | Performed by: NURSE PRACTITIONER

## 2021-04-13 NOTE — PROGRESS NOTES
Subjective   Celso Mcelroy is a 85 y.o. male.   Chief Complaint   Patient presents with   • Chest Pain     Ref PCP; cardiac eval      History of Present Illness   Celso Mcelroy is a 85-year-old  male who presents to the clinic today as a new patient accompanied by his son for cardiac evaluation.  He states about 3 weeks ago he had an episode of chest discomfort and was taken via ambulance to [facility] for evaluation.  He reports he was monitored for 24 hours and discharged home to follow-up with cardiology.  He reports that during his hospitalization he had some imaging studies, which were sent with records today. He reports since that time he has had no further episodes of chest pain.  He feels like it may be related to left rotator cuff injury in the past. He reports previous test stress testing and echocardiogram in 2014 with Dr. Chou.  He denies any previous history of cardiovascular stents or bypass surgery.  He denies significant family history of cardiovascular disease.  He has underlying history of hypertension and currently takes valsartan 320 mg daily and Norvasc 2.5 mg daily.  He reports intermittent monitoring and readings are normal at 120/60 and 140/90 and heart rate averages about 60 bpm. hyperlipidemia on Zocor 20 mg nightly and TriCor 145 mg daily.  History of a abdominal aortic aneurysm with repair at the [facility] in 2001.  His primary has been following that is concerned because of changes in size recently on imaging study.  Former smoker quit many years ago.  He reports he walks daily about 1 to 2 miles he denies any significant dyspnea or chest discomfort with this activity denies dizziness, lightheadedness, or syncope.     He is concerned and complains of left lower extremity pain that has been going on for 3 or 4 months not significantly changed.  Describes the pain as a crampy feeling.  He reports it is no better no worse.  He denies any swelling in his lower  extremities.  He is concerned this may be related to his heart and would like it further evaluated.  He denies any lower extremity discoloration.  He reports he has tried taking potassium with no relief in symptoms.  He reports the pain is mostly at nighttime.  He does not describe the pain as numbness or tingling.    The following portions of the patient's history were reviewed and updated as appropriate: allergies, current medications, past family history, past medical history, past social history, past surgical history and problem list.    Current Outpatient Medications:   •  acetaminophen (TYLENOL) 650 MG 8 hr tablet, Take 650 mg by mouth Every 8 (Eight) Hours As Needed for mild pain (1-3)., Disp: , Rfl:   •  amLODIPine (NORVASC) 2.5 MG tablet, 2.5 mg Every Evening., Disp: , Rfl:   •  Brinzolamide-Brimonidine (SIMBRINZA) 1-0.2 % suspension, Apply  to eye., Disp: , Rfl:   •  fenofibrate (TRICOR) 145 MG tablet, Take 145 mg by mouth Daily., Disp: , Rfl:   •  simvastatin (ZOCOR) 20 MG tablet, Take 20 mg by mouth Every Night., Disp: , Rfl:   •  TRAVATAN Z 0.004 % solution ophthalmic solution, As Needed., Disp: , Rfl:   •  valsartan (DIOVAN) 320 MG tablet, Take 320 mg by mouth., Disp: , Rfl:   •  sildenafil (VIAGRA) 25 MG tablet, Take 1 tablet by mouth As Needed for Erectile Dysfunction. May take up to 4 tabs daily. Do not exceed 100 mg in a day. Take 30-60 minutes prior to intercourse, Disp: 30 tablet, Rfl: 5  •  tamsulosin (FLOMAX) 0.4 MG capsule 24 hr capsule, Take 1 capsule by mouth Daily., Disp: 30 capsule, Rfl: 11    Review of Systems   Constitutional: Negative for activity change, appetite change, chills, fatigue and fever.   HENT: Negative for congestion, drooling, ear discharge, ear pain, mouth sores, nosebleeds, postnasal drip, rhinorrhea, sneezing and sore throat.    Eyes: Negative for pain, discharge and visual disturbance.   Respiratory: Negative for cough, shortness of breath and wheezing.   "  Gastrointestinal: Negative for abdominal pain, constipation, diarrhea, nausea and vomiting.   Skin: Negative for rash and wound.   Neurological: Negative for headaches.   All other systems reviewed and are negative.    /68 (BP Location: Left arm, Patient Position: Sitting)   Pulse 61   Temp 96.7 °F (35.9 °C) (Temporal)   Resp 16   Ht 170.2 cm (67\")   Wt 75.8 kg (167 lb)   SpO2 98%   BMI 26.16 kg/m²     ECG 12 Lead    Date/Time: 4/15/2021 10:21 AM  Performed by: Charlette Lewis APRN  Authorized by: Charlette Lewis APRN   Comparison: not compared with previous ECG   Rhythm: sinus rhythm  Rate: normal  BPM: 63    Clinical impression: normal ECG          Objective   Allergies   Allergen Reactions   • Percocet [Oxycodone-Acetaminophen] Nausea Only   • Codeine Other (See Comments)     headaches   • Morphine And Related Hallucinations     Physical Exam  Vitals reviewed.   Constitutional:       Appearance: Normal appearance. He is well-developed.   HENT:      Head: Normocephalic.   Eyes:      Conjunctiva/sclera: Conjunctivae normal.      Pupils: Pupils are equal, round, and reactive to light.   Neck:      Thyroid: No thyromegaly.      Vascular: No carotid bruit or JVD.   Cardiovascular:      Rate and Rhythm: Normal rate and regular rhythm.   Pulmonary:      Effort: Pulmonary effort is normal.      Breath sounds: Normal breath sounds.   Abdominal:      General: Bowel sounds are normal.      Palpations: Abdomen is soft. There is no hepatomegaly, splenomegaly or mass.      Tenderness: There is no abdominal tenderness.   Musculoskeletal:         General: Normal range of motion.      Cervical back: Normal range of motion and neck supple.      Right lower leg: No edema.      Left lower leg: No edema.      Comments: Walks with a cane,  Left lower extremity without swelling or discoloration negative Homans' sign.  No tenderness with palpation and unable to reproduce pain.   Skin:     General: Skin is warm and " dry.   Neurological:      Mental Status: He is alert.   Psychiatric:         Attention and Perception: Attention normal.         Speech: Speech normal.         Behavior: Behavior normal. Behavior is cooperative.         Cognition and Memory: Cognition normal.       Assessment/Plan   Diagnoses and all orders for this visit:    1. Chest pain, unspecified type (Primary)  -     ECG 12 Lead; Future  -     Stress Test With Myocardial Perfusion (1 Day); Future  -     Adult Transthoracic Echo Complete W/ Cont if Necessary Per Protocol; Future  -     ECG 12 Lead  EKG, reviewed and discussed today, no acute changes    Review of chest x-ray from ER visit at [facility] on 3/6/2021 showing no acute cardiopulmonary process.      2. Palpitations  -     Cardiac Event Monitor; Future    3. AAA (abdominal aortic aneurysm) without rupture (CMS/HCC)  -     Ambulatory Referral to Cardiothoracic Surgery  Review of CT from [facility] of the abdomen pelvis without contrast indicating a infrarenal abdominal aortic aneurysm measuring 3.4 cm.  Primary note indicates that his aortic aneurysm has increased in the last 6 months from 2.4 cm (from sonography) to 3.4cm (from CT).     4. Pain of left lower extremity  -     US Arterial Doppler Lower Extremity Left; Future  -     US venous doppler lower extremity bilateral (duplex); Future    5. Claudication of left lower extremity (CMS/HCC)  -     US Arterial Doppler Lower Extremity Left; Future    6. History of prostate cancer  Follows with urology    Review of labs from [facility] from 3/23/2021 indicates CMP with essentially normal creatinine and liver enzymes and potassium.  Cholesterol with a total of 135, triglycerides at 349, HDL at 35 and LDL at 53. EKG showing sinus bradycardia and sinus bradycardia with first-degree AV block however it is difficult to visualize.     Patient denies diabetes but when reviewing his labs from his PCP on 2/23/2021 his hemoglobin A1c is 7.3.      Follow-up in 6 weeks, sooner if  needed.  Advised if new or worsening symptoms while awaiting work-up he should go to the ER he expresses understanding.

## 2021-04-15 ENCOUNTER — HOSPITAL ENCOUNTER (OUTPATIENT)
Dept: CARDIOLOGY | Facility: HOSPITAL | Age: 86
Discharge: HOME | End: 2021-04-15
Admitting: NURSE PRACTITIONER

## 2021-04-15 DIAGNOSIS — M79.605 PAIN OF LEFT LOWER EXTREMITY: ICD-10-CM

## 2021-04-15 PROCEDURE — 93970 EXTREMITY STUDY: CPT | Performed by: RADIOLOGY

## 2021-04-15 PROCEDURE — 93970 EXTREMITY STUDY: CPT

## 2021-04-15 PROCEDURE — 93000 ELECTROCARDIOGRAM COMPLETE: CPT | Performed by: NURSE PRACTITIONER

## 2021-04-19 ENCOUNTER — TELEPHONE (OUTPATIENT)
Dept: CARDIOLOGY | Facility: CLINIC | Age: 86
End: 2021-04-19

## 2021-05-05 ENCOUNTER — TREATMENT (OUTPATIENT)
Dept: CARDIOLOGY | Facility: CLINIC | Age: 86
End: 2021-05-05

## 2021-05-05 DIAGNOSIS — R00.2 PALPITATIONS: ICD-10-CM

## 2021-05-05 PROCEDURE — 93248 EXT ECG>7D<15D REV&INTERPJ: CPT | Performed by: INTERNAL MEDICINE

## 2021-05-18 ENCOUNTER — HOSPITAL ENCOUNTER (OUTPATIENT)
Dept: CARDIOLOGY | Facility: HOSPITAL | Age: 86
Discharge: HOME | End: 2021-05-18

## 2021-05-18 ENCOUNTER — HOSPITAL ENCOUNTER (OUTPATIENT)
Dept: NUCLEAR MEDICINE | Facility: HOSPITAL | Age: 86
Discharge: HOME | End: 2021-05-18

## 2021-05-18 DIAGNOSIS — R07.9 CHEST PAIN, UNSPECIFIED TYPE: ICD-10-CM

## 2021-05-18 DIAGNOSIS — I73.9 CLAUDICATION OF LEFT LOWER EXTREMITY (HCC): ICD-10-CM

## 2021-05-18 DIAGNOSIS — M79.605 PAIN OF LEFT LOWER EXTREMITY: ICD-10-CM

## 2021-05-18 LAB
BH CV ECHO MEAS - ACS: 2.4 CM
BH CV ECHO MEAS - AO ROOT AREA (BSA CORRECTED): 2
BH CV ECHO MEAS - AO ROOT AREA: 10.8 CM^2
BH CV ECHO MEAS - AO ROOT DIAM: 3.7 CM
BH CV ECHO MEAS - BSA(HAYCOCK): 1.9 M^2
BH CV ECHO MEAS - BSA: 1.9 M^2
BH CV ECHO MEAS - BZI_BMI: 26.2 KILOGRAMS/M^2
BH CV ECHO MEAS - BZI_METRIC_HEIGHT: 170.2 CM
BH CV ECHO MEAS - BZI_METRIC_WEIGHT: 75.8 KG
BH CV ECHO MEAS - EDV(CUBED): 91.1 ML
BH CV ECHO MEAS - EDV(MOD-SP4): 54.2 ML
BH CV ECHO MEAS - EDV(TEICH): 92.4 ML
BH CV ECHO MEAS - EF(CUBED): 48.8 %
BH CV ECHO MEAS - EF(MOD-SP4): 65.7 %
BH CV ECHO MEAS - EF(TEICH): 41.1 %
BH CV ECHO MEAS - ESV(CUBED): 46.7 ML
BH CV ECHO MEAS - ESV(MOD-SP4): 18.6 ML
BH CV ECHO MEAS - ESV(TEICH): 54.4 ML
BH CV ECHO MEAS - FS: 20 %
BH CV ECHO MEAS - IVS/LVPW: 1.5
BH CV ECHO MEAS - IVSD: 1.5 CM
BH CV ECHO MEAS - LA DIMENSION: 4.3 CM
BH CV ECHO MEAS - LA/AO: 1.2
BH CV ECHO MEAS - LV DIASTOLIC VOL/BSA (35-75): 28.9 ML/M^2
BH CV ECHO MEAS - LV MASS(C)D: 210.2 GRAMS
BH CV ECHO MEAS - LV MASS(C)DI: 112.2 GRAMS/M^2
BH CV ECHO MEAS - LV SYSTOLIC VOL/BSA (12-30): 9.9 ML/M^2
BH CV ECHO MEAS - LVIDD: 4.5 CM
BH CV ECHO MEAS - LVIDS: 3.6 CM
BH CV ECHO MEAS - LVLD AP4: 7.4 CM
BH CV ECHO MEAS - LVLS AP4: 6.7 CM
BH CV ECHO MEAS - LVOT AREA (M): 3.1 CM^2
BH CV ECHO MEAS - LVOT AREA: 3.1 CM^2
BH CV ECHO MEAS - LVOT DIAM: 2 CM
BH CV ECHO MEAS - LVPWD: 1 CM
BH CV ECHO MEAS - MV A MAX VEL: 101 CM/SEC
BH CV ECHO MEAS - MV E MAX VEL: 95.1 CM/SEC
BH CV ECHO MEAS - MV E/A: 0.94
BH CV ECHO MEAS - SI(CUBED): 23.7 ML/M^2
BH CV ECHO MEAS - SI(MOD-SP4): 19 ML/M^2
BH CV ECHO MEAS - SI(TEICH): 20.3 ML/M^2
BH CV ECHO MEAS - SV(CUBED): 44.5 ML
BH CV ECHO MEAS - SV(MOD-SP4): 35.6 ML
BH CV ECHO MEAS - SV(TEICH): 38 ML
BH CV NUCLEAR PRIOR STUDY: 3
BH CV REST NUCLEAR ISOTOPE DOSE: 10.8 MCI
BH CV STRESS BP STAGE 1: NORMAL
BH CV STRESS DURATION MIN STAGE 1: 3
BH CV STRESS DURATION MIN STAGE 2: 3
BH CV STRESS DURATION SEC STAGE 1: 0
BH CV STRESS DURATION SEC STAGE 2: 0
BH CV STRESS GRADE STAGE 1: 10
BH CV STRESS GRADE STAGE 2: 12
BH CV STRESS HR STAGE 1: 109
BH CV STRESS HR STAGE 2: 127
BH CV STRESS METS STAGE 1: 5
BH CV STRESS METS STAGE 2: 7.5
BH CV STRESS NUCLEAR ISOTOPE DOSE: 30.8 MCI
BH CV STRESS PROTOCOL 1: NORMAL
BH CV STRESS RECOVERY BP: NORMAL MMHG
BH CV STRESS RECOVERY HR: 84 BPM
BH CV STRESS SPEED STAGE 1: 1.7
BH CV STRESS SPEED STAGE 2: 2.5
BH CV STRESS STAGE 1: 1
BH CV STRESS STAGE 2: 2
LV EF NUC BP: 74 %
MAXIMAL PREDICTED HEART RATE: 135 BPM
MAXIMAL PREDICTED HEART RATE: 135 BPM
PERCENT MAX PREDICTED HR: 95.56 %
STRESS BASELINE BP: NORMAL MMHG
STRESS BASELINE HR: 61 BPM
STRESS PERCENT HR: 112 %
STRESS POST ESTIMATED WORKLOAD: 7 METS
STRESS POST EXERCISE DUR MIN: 4 MIN
STRESS POST EXERCISE DUR SEC: 30 SEC
STRESS POST PEAK BP: NORMAL MMHG
STRESS POST PEAK HR: 129 BPM
STRESS TARGET HR: 115 BPM
STRESS TARGET HR: 115 BPM

## 2021-05-18 PROCEDURE — 93018 CV STRESS TEST I&R ONLY: CPT | Performed by: INTERNAL MEDICINE

## 2021-05-18 PROCEDURE — 93306 TTE W/DOPPLER COMPLETE: CPT | Performed by: INTERNAL MEDICINE

## 2021-05-18 PROCEDURE — 93926 LOWER EXTREMITY STUDY: CPT

## 2021-05-18 PROCEDURE — A9500 TC99M SESTAMIBI: HCPCS | Performed by: NURSE PRACTITIONER

## 2021-05-18 PROCEDURE — 78452 HT MUSCLE IMAGE SPECT MULT: CPT | Performed by: INTERNAL MEDICINE

## 2021-05-18 PROCEDURE — 93017 CV STRESS TEST TRACING ONLY: CPT

## 2021-05-18 PROCEDURE — 93306 TTE W/DOPPLER COMPLETE: CPT

## 2021-05-18 PROCEDURE — 78452 HT MUSCLE IMAGE SPECT MULT: CPT

## 2021-05-18 PROCEDURE — 0 TECHNETIUM SESTAMIBI: Performed by: NURSE PRACTITIONER

## 2021-05-18 PROCEDURE — 93926 LOWER EXTREMITY STUDY: CPT | Performed by: RADIOLOGY

## 2021-05-18 RX ADMIN — TECHNETIUM TC 99M SESTAMIBI 1 DOSE: 1 INJECTION INTRAVENOUS at 09:45

## 2021-05-18 RX ADMIN — TECHNETIUM TC 99M SESTAMIBI 1 DOSE: 1 INJECTION INTRAVENOUS at 08:25

## 2021-05-24 DIAGNOSIS — Z00.6 EXAMINATION FOR NORMAL COMPARISON FOR CLINICAL RESEARCH: Primary | ICD-10-CM

## 2021-05-25 ENCOUNTER — OFFICE VISIT (OUTPATIENT)
Dept: CARDIOLOGY | Facility: CLINIC | Age: 86
End: 2021-05-25

## 2021-05-25 VITALS
OXYGEN SATURATION: 97 % | DIASTOLIC BLOOD PRESSURE: 70 MMHG | TEMPERATURE: 98.5 F | SYSTOLIC BLOOD PRESSURE: 128 MMHG | BODY MASS INDEX: 27.4 KG/M2 | HEIGHT: 67 IN | WEIGHT: 174.6 LBS | HEART RATE: 60 BPM

## 2021-05-25 DIAGNOSIS — R00.1 BRADYCARDIA: ICD-10-CM

## 2021-05-25 DIAGNOSIS — I71.40 AAA (ABDOMINAL AORTIC ANEURYSM) WITHOUT RUPTURE (HCC): ICD-10-CM

## 2021-05-25 DIAGNOSIS — M79.605 PAIN OF LEFT LOWER EXTREMITY: ICD-10-CM

## 2021-05-25 DIAGNOSIS — I10 ESSENTIAL HYPERTENSION: ICD-10-CM

## 2021-05-25 DIAGNOSIS — E78.5 HYPERLIPIDEMIA, UNSPECIFIED HYPERLIPIDEMIA TYPE: Primary | ICD-10-CM

## 2021-05-25 PROCEDURE — 99214 OFFICE O/P EST MOD 30 MIN: CPT | Performed by: NURSE PRACTITIONER

## 2021-05-25 NOTE — PROGRESS NOTES
Subjective   Celso Mcelroy is a 85 y.o. male.   Chief Complaint   Patient presents with   • Results     stress test, monitor, echo   • Med Management      History of Present Illness   Celso Mcelroy is a 85-year-old  male who presents to the clinic today accompanied by his son for follow-up and review of testing results.    He has had no further episodes of chest pain.  He has continued to walk daily denies exertional chest pain or exertional dyspnea. He has underlying history of hypertension and currently takes valsartan 320 mg daily and Norvasc 2.5 mg daily.  He reports intermittent monitoring and readings are normal at 120/60 and heart rate averages about 60 bpm.  Denies heart rates less than 50 at home. Denies dizziness, lightheadedness or syncope. Hyperlipidemia on Zocor 20 mg nightly and TriCor 145 mg daily.  He denies myalgias. history of a abdominal aortic aneurysm with repair at the [facility] in 2001.  His primary has been following that is concerned because of changes in size recently on imaging study.  He has an appointment on June 16 to see Dr. Celeste for further evaluation. Former smoker quit many years ago.     He states the lower extremity pain and cramps have resolved.  He has stopped eating sweets after supper and feels this has helped his symptoms significantly.  He is also not wearing compression stockings/socks which seem to also aid in improving his symptoms.  He did undergo imaging of the lower extremity.  He denies any lower extremity discoloration.  He does not describe the pain as numbness or tingling.    The following portions of the patient's history were reviewed and updated as appropriate: allergies, current medications, past family history, past medical history, past social history, past surgical history and problem list.    Current Outpatient Medications:   •  acetaminophen (TYLENOL) 650 MG 8 hr tablet, Take 650 mg by mouth Every 8 (Eight) Hours As Needed for  "mild pain (1-3)., Disp: , Rfl:   •  amLODIPine (NORVASC) 2.5 MG tablet, 2.5 mg Every Evening., Disp: , Rfl:   •  Brinzolamide-Brimonidine (SIMBRINZA) 1-0.2 % suspension, Apply  to eye., Disp: , Rfl:   •  fenofibrate (TRICOR) 145 MG tablet, Take 145 mg by mouth Daily., Disp: , Rfl:   •  sildenafil (VIAGRA) 25 MG tablet, Take 1 tablet by mouth As Needed for Erectile Dysfunction. May take up to 4 tabs daily. Do not exceed 100 mg in a day. Take 30-60 minutes prior to intercourse, Disp: 30 tablet, Rfl: 5  •  simvastatin (ZOCOR) 20 MG tablet, Take 20 mg by mouth Every Night., Disp: , Rfl:   •  tamsulosin (FLOMAX) 0.4 MG capsule 24 hr capsule, Take 1 capsule by mouth Daily., Disp: 30 capsule, Rfl: 11  •  TRAVATAN Z 0.004 % solution ophthalmic solution, As Needed., Disp: , Rfl:   •  valsartan (DIOVAN) 320 MG tablet, Take 320 mg by mouth., Disp: , Rfl:     Review of Systems   Constitutional: Negative for activity change, appetite change, chills, fatigue and fever.   HENT: Negative for congestion, drooling, ear discharge, ear pain, mouth sores, nosebleeds, postnasal drip, rhinorrhea, sneezing and sore throat.    Eyes: Negative for pain, discharge and visual disturbance.   Respiratory: Negative for cough, shortness of breath and wheezing.    Cardiovascular: Negative for chest pain, palpitations and leg swelling.   Gastrointestinal: Negative for abdominal pain, constipation, diarrhea, nausea and vomiting.   Skin: Negative for rash and wound.   Neurological: Negative for headaches.   All other systems reviewed and are negative.    /70 (BP Location: Left arm, Patient Position: Sitting, Cuff Size: Adult)   Pulse 60   Temp 98.5 °F (36.9 °C)   Ht 170.2 cm (67\")   Wt 79.2 kg (174 lb 9.6 oz)   SpO2 97%   BMI 27.35 kg/m²     Objective   Allergies   Allergen Reactions   • Percocet [Oxycodone-Acetaminophen] Nausea Only   • Codeine Other (See Comments)     headaches   • Morphine And Related Hallucinations       Physical " Exam  Vitals reviewed.   Constitutional:       Appearance: Normal appearance. He is well-developed.   HENT:      Head: Normocephalic.   Eyes:      Conjunctiva/sclera: Conjunctivae normal.      Pupils: Pupils are equal, round, and reactive to light.   Neck:      Thyroid: No thyromegaly.      Vascular: No carotid bruit or JVD.   Cardiovascular:      Rate and Rhythm: Normal rate and regular rhythm.   Pulmonary:      Effort: Pulmonary effort is normal.      Breath sounds: Normal breath sounds.   Abdominal:      General: Bowel sounds are normal.      Palpations: Abdomen is soft. There is no hepatomegaly, splenomegaly or mass.      Tenderness: There is no abdominal tenderness.   Musculoskeletal:         General: Normal range of motion.      Cervical back: Normal range of motion and neck supple.   Skin:     General: Skin is warm and dry.   Neurological:      Mental Status: He is alert and oriented to person, place, and time.   Psychiatric:         Attention and Perception: Attention normal.         Mood and Affect: Mood normal.         Speech: Speech normal.         Behavior: Behavior normal. Behavior is cooperative.         Cognition and Memory: Cognition normal.         Assessment/Plan   Diagnoses and all orders for this visit:    1. Hyperlipidemia, unspecified hyperlipidemia type (Primary)  LDL at goal continue on TriCor and Zocor.     2. Essential hypertension  Continue on Valsartan and Norvasc.  Counseling in sodium dietary restrictions.  Daily monitoring of BP.     3. AAA (abdominal aortic aneurysm) without rupture (CMS/HCC)  Await further evaluation by cardiothoracic surgeon    4. Pain of left lower extremity  Arterial and venous Doppler reviewed and discussed.  Pain has resolved.  We will continue to monitor    5. Bradycardia, asymptomatic   Continue to monitor. If he develops symptoms further work-up will be warranted.     Nuclear stress test, reviewed and discussed results  Echocardiogram, reviewed and discussed  results  Holter monitor, 14 days, reviewed and discussed results  Follow-up in 6 months, sooner if needed

## 2021-06-16 ENCOUNTER — OFFICE VISIT (OUTPATIENT)
Dept: CARDIAC SURGERY | Facility: CLINIC | Age: 86
End: 2021-06-16

## 2021-06-16 VITALS
DIASTOLIC BLOOD PRESSURE: 70 MMHG | BODY MASS INDEX: 26.07 KG/M2 | HEIGHT: 68 IN | OXYGEN SATURATION: 99 % | SYSTOLIC BLOOD PRESSURE: 150 MMHG | HEART RATE: 60 BPM | WEIGHT: 172 LBS | TEMPERATURE: 97.7 F

## 2021-06-16 DIAGNOSIS — I71.40 AAA (ABDOMINAL AORTIC ANEURYSM) WITHOUT RUPTURE (HCC): Primary | ICD-10-CM

## 2021-06-16 PROCEDURE — 99202 OFFICE O/P NEW SF 15 MIN: CPT | Performed by: THORACIC SURGERY (CARDIOTHORACIC VASCULAR SURGERY)

## 2021-06-16 RX ORDER — NETARSUDIL AND LATANOPROST OPHTHALMIC SOLUTION, 0.02%/0.005% .2; .05 MG/ML; MG/ML
SOLUTION/ DROPS OPHTHALMIC; TOPICAL DAILY
COMMUNITY
Start: 2021-06-01

## 2021-06-16 NOTE — PROGRESS NOTES
06/16/2021  Patient Information  Celso Mcelroy                                                                                          [address]  [place] [address]   1935  'PCP/Referring Physician'  Emilie Rausch MD  [phone]  Charlette Lewis, APRN  [phone]  Chief Complaint   Patient presents with   • Consult     NP per Charlette Lewis APRN for 3.4 cm AAA-Hx of Endo AAA Repair in 2001 in [place]       History of Present Illness: 85-year-old  male with a history of hypertension, hyperlipidemia, prostate cancer, previous tobacco abuse and abdominal aortic aneurysm status post [facility] on 9/9/2001 who presents for follow-up of his known abdominal aortic aneurysm.  Overall, Mr. Mcelroy is without complaints and denies abdominal pain.      Patient Active Problem List   Diagnosis   • Nocturia   • Prostate cancer (CMS/HCC)   • Erectile dysfunction     Past Medical History:   Diagnosis Date   • AAA (abdominal aortic aneurysm) (CMS/HCC)    • Arthritis    • Cancer (CMS/HCC)    • Coronary artery disease    • Elevated cholesterol    • Glaucoma    • Hypertension    • Prostate cancer (CMS/HCC)    • Skin cancer      Past Surgical History:   Procedure Laterality Date   • ABDOMINAL AORTIC ANEURYSM REPAIR     • ABDOMINAL AORTIC ANEURYSM REPAIR     • ABDOMINAL AORTIC ANEURYSM REPAIR     • BACK SURGERY      cervical   • COLONOSCOPY  05/2019   • CYBERKNIFE  01/17/2020    Prostate   • ENDOSCOPY     • EYE SURGERY Bilateral     iol-Cataract and Scar Tissue removed right eye   • GALLBLADDER SURGERY     • PROSTATE FIDUCIAL MARKER PLACEMENT N/A 12/16/2019    Procedure: PROSTATE FIDUCIAL MARKER PLACEMENT WITH 6 MARKERS;  Surgeon: Tom Pineda MD;  Location: [facility];  Service: Urology       Current Outpatient Medications:   •  amLODIPine (NORVASC) 2.5 MG tablet, 2.5 mg Every Evening., Disp: , Rfl:   •  fenofibrate (TRICOR) 145  MG tablet, Take 145 mg by mouth Daily., Disp: , Rfl:   •  Rocklatan 0.02-0.005 % solution, Daily., Disp: , Rfl:   •  simvastatin (ZOCOR) 20 MG tablet, Take 20 mg by mouth Every Night., Disp: , Rfl:   •  valsartan (DIOVAN) 320 MG tablet, Take 320 mg by mouth., Disp: , Rfl:   •  acetaminophen (TYLENOL) 650 MG 8 hr tablet, Take 650 mg by mouth Every 8 (Eight) Hours As Needed for mild pain (1-3)., Disp: , Rfl:   •  Brinzolamide-Brimonidine (SIMBRINZA) 1-0.2 % suspension, Apply  to eye., Disp: , Rfl:   •  sildenafil (VIAGRA) 25 MG tablet, Take 1 tablet by mouth As Needed for Erectile Dysfunction. May take up to 4 tabs daily. Do not exceed 100 mg in a day. Take 30-60 minutes prior to intercourse, Disp: 30 tablet, Rfl: 5  •  tamsulosin (FLOMAX) 0.4 MG capsule 24 hr capsule, Take 1 capsule by mouth Daily., Disp: 30 capsule, Rfl: 11  •  TRAVATAN Z 0.004 % solution ophthalmic solution, As Needed., Disp: , Rfl:   Allergies   Allergen Reactions   • Percocet [Oxycodone-Acetaminophen] Nausea Only   • Codeine Other (See Comments)     headaches   • Morphine And Related Hallucinations     Social History     Socioeconomic History   • Marital status:      Spouse name: Not on file   • Number of children: 3   • Years of education: Not on file   • Highest education level: Not on file   Tobacco Use   • Smoking status: Former Smoker     Packs/day: 0.50     Years: 15.00     Pack years: 7.50     Types: Cigarettes     Quit date: 6/15/1981     Years since quittin.0   • Smokeless tobacco: Former User     Types: Chew     Quit date: 6/15/1961   Substance and Sexual Activity   • Alcohol use: No   • Drug use: No   • Sexual activity: Defer     Family History   Problem Relation Age of Onset   • No Known Problems Father    • No Known Problems Mother      Review of Systems   Constitutional: Negative for chills, fever, malaise/fatigue, night sweats and weight loss.   HENT: Negative for hearing loss, odynophagia and sore throat.   "  Cardiovascular: Negative for chest pain, dyspnea on exertion, leg swelling, orthopnea and palpitations.   Respiratory: Negative for cough and hemoptysis.    Endocrine: Negative for cold intolerance, heat intolerance, polydipsia, polyphagia and polyuria.   Hematologic/Lymphatic: Bruises/bleeds easily.   Skin: Negative for itching and rash.   Musculoskeletal: Positive for muscle cramps (occasionally). Negative for joint pain, joint swelling and myalgias.   Gastrointestinal: Negative for abdominal pain, constipation, diarrhea, hematemesis, hematochezia, melena, nausea and vomiting.   Genitourinary: Negative for dysuria, frequency and hematuria.   Neurological: Negative for focal weakness, headaches, numbness and seizures.   Psychiatric/Behavioral: Negative for suicidal ideas.   All other systems reviewed and are negative.    Vitals:    06/16/21 1123   BP: 150/70   BP Location: Left arm   Patient Position: Sitting   Pulse: 60   Temp: 97.7 °F (36.5 °C)   SpO2: 99%   Weight: 78 kg (172 lb)   Height: 172.7 cm (68\")      Physical Exam  Vitals reviewed.   Constitutional:       General: He is not in acute distress.     Appearance: He is well-developed. He is not diaphoretic.      Comments:  male who appears stated age and is present with his son   HENT:      Head: Normocephalic and atraumatic.   Eyes:      General: No scleral icterus.     Conjunctiva/sclera: Conjunctivae normal.   Neck:      Vascular: No JVD.      Trachea: No tracheal deviation.   Cardiovascular:      Rate and Rhythm: Normal rate and regular rhythm.      Heart sounds: Normal heart sounds. No murmur heard.   No friction rub. No gallop.    Pulmonary:      Effort: Pulmonary effort is normal. No respiratory distress.      Breath sounds: Normal breath sounds. No wheezing or rales.   Abdominal:      General: There is no distension.      Palpations: Abdomen is soft. There is no mass.      Tenderness: There is no abdominal tenderness. There is no guarding " or rebound.   Musculoskeletal:         General: Normal range of motion.      Cervical back: Neck supple.   Skin:     General: Skin is warm and dry.      Findings: No erythema or rash.   Neurological:      Mental Status: He is alert and oriented to person, place, and time.   Psychiatric:         Behavior: Behavior normal.         Thought Content: Thought content normal.         Judgment: Judgment normal.         The ROS, past medical history, surgical history, family history, social history and vitals were reviewed by myself and corrected as needed.      Labs/Imaging:  -CT of the abdomen and pelvis without contrast performed 3/5/2021, personally reviewed demonstrates an infrarenal abdominal aorta status post open repair.  Radiology interpretation notes a 3.4 cm infrarenal abdominal aortic aneurysm.  On personal review, this appears to be the conduit overlying his native aortic bifurcation, however this study is limited without contrast.     Assessment/Plan:  85-year-old  male with a history of hypertension, hyperlipidemia, prostate cancer, previous tobacco abuse and abdominal aortic aneurysm status post open repair in [facility] on 9/9/2001 who presents for follow-up of his known abdominal aortic aneurysm.  The operative note from the patient's repair will be obtained from the [facility] in [place].  The imaging from [facility] in 2019 will also be obtained for review given the discrepancy in measuring from radiology interpretation.  Overall, this CT scan is normal in appearance on my personal review after an open abdominal aortic aneurysm repair.  I will arrange for a CTA of the abdomen and pelvis with contrast in 1 year for continued surveillance.    Patient Active Problem List   Diagnosis   • Nocturia   • Prostate cancer (CMS/HCC)   • Erectile dysfunction

## 2021-06-18 DIAGNOSIS — Z00.6 EXAMINATION FOR NORMAL COMPARISON FOR CLINICAL RESEARCH: Primary | ICD-10-CM

## 2021-09-26 ENCOUNTER — HOSPITAL ENCOUNTER (EMERGENCY)
Dept: HOSPITAL 79 - ER1 | Age: 86
LOS: 1 days | Discharge: HOME | End: 2021-09-27
Payer: MEDICARE

## 2021-09-26 DIAGNOSIS — I10: ICD-10-CM

## 2021-09-26 DIAGNOSIS — Z88.8: ICD-10-CM

## 2021-09-26 DIAGNOSIS — E78.5: ICD-10-CM

## 2021-09-26 DIAGNOSIS — R11.2: ICD-10-CM

## 2021-09-26 DIAGNOSIS — R10.9: Primary | ICD-10-CM

## 2021-09-26 DIAGNOSIS — R06.6: ICD-10-CM

## 2021-09-26 LAB
BUN/CREATININE RATIO: 23 (ref 0–10)
HGB BLD-MCNC: 13.6 GM/DL (ref 14–17.5)
RED BLOOD COUNT: 4.19 M/UL (ref 4.2–5.5)
WHITE BLOOD COUNT: 9.7 K/UL (ref 4.5–11)

## 2021-10-05 ENCOUNTER — OFFICE VISIT (OUTPATIENT)
Dept: UROLOGY | Facility: CLINIC | Age: 86
End: 2021-10-05

## 2021-10-05 VITALS
SYSTOLIC BLOOD PRESSURE: 150 MMHG | BODY MASS INDEX: 26.06 KG/M2 | WEIGHT: 171.96 LBS | DIASTOLIC BLOOD PRESSURE: 70 MMHG | HEIGHT: 68 IN

## 2021-10-05 DIAGNOSIS — N52.1 ERECTILE DYSFUNCTION DUE TO DISEASES CLASSIFIED ELSEWHERE: Primary | ICD-10-CM

## 2021-10-05 DIAGNOSIS — C61 PROSTATE CANCER (HCC): ICD-10-CM

## 2021-10-05 PROCEDURE — 99213 OFFICE O/P EST LOW 20 MIN: CPT | Performed by: UROLOGY

## 2021-10-05 NOTE — PROGRESS NOTES
Chief Complaint:          Chief Complaint   Patient presents with   • Prostate Cancer       HPI:   85 y.o. male status post radiotherapy in 2020 with [facility] in [place].  Has no significant symptomatology particularly no leakage.  Has erectile dysfunction after and I Desirae start Viagra per his request.  He reports no lower urinary tract symptomatology particularly irritative symptoms such as frequency urgency dysuria and obstructive symptomatology particularly dribbling, hesitancy, intermittency.      Past Medical History:        Past Medical History:   Diagnosis Date   • AAA (abdominal aortic aneurysm) (HCC)    • Arthritis    • Cancer (HCC)    • Coronary artery disease    • Elevated cholesterol    • Glaucoma    • Hypertension    • Prostate cancer (HCC)    • Skin cancer          Current Meds:     Current Outpatient Medications   Medication Sig Dispense Refill   • acetaminophen (TYLENOL) 650 MG 8 hr tablet Take 650 mg by mouth Every 8 (Eight) Hours As Needed for mild pain (1-3).     • amLODIPine (NORVASC) 2.5 MG tablet 2.5 mg Every Evening.     • Brinzolamide-Brimonidine (SIMBRINZA) 1-0.2 % suspension Apply  to eye.     • fenofibrate (TRICOR) 145 MG tablet Take 145 mg by mouth Daily.     • Rocklatan 0.02-0.005 % solution Daily.     • sildenafil (VIAGRA) 25 MG tablet Take 1 tablet by mouth As Needed for Erectile Dysfunction. May take up to 4 tabs daily. Do not exceed 100 mg in a day. Take 30-60 minutes prior to intercourse 30 tablet 5   • simvastatin (ZOCOR) 20 MG tablet Take 20 mg by mouth Every Night.     • tamsulosin (FLOMAX) 0.4 MG capsule 24 hr capsule Take 1 capsule by mouth Daily. 30 capsule 11   • TRAVATAN Z 0.004 % solution ophthalmic solution As Needed.     • valsartan (DIOVAN) 320 MG tablet Take 320 mg by mouth.       No current facility-administered medications for this visit.        Allergies:      Allergies   Allergen Reactions   • Percocet [Oxycodone-Acetaminophen] Nausea Only   • Codeine Other  (See Comments)     headaches   • Morphine And Related Hallucinations        Past Surgical History:     Past Surgical History:   Procedure Laterality Date   • ABDOMINAL AORTIC ANEURYSM REPAIR     • ABDOMINAL AORTIC ANEURYSM REPAIR     • ABDOMINAL AORTIC ANEURYSM REPAIR     • BACK SURGERY      cervical   • COLONOSCOPY  2019   • CYBERKNIFE  2020    Prostate   • ENDOSCOPY     • EYE SURGERY Bilateral     iol-Cataract and Scar Tissue removed right eye   • GALLBLADDER SURGERY     • PROSTATE FIDUCIAL MARKER PLACEMENT N/A 2019    Procedure: PROSTATE FIDUCIAL MARKER PLACEMENT WITH 6 MARKERS;  Surgeon: Tom Pineda MD;  Location: [facility];  Service: Urology         Social History:     Social History     Socioeconomic History   • Marital status:      Spouse name: Not on file   • Number of children: 3   • Years of education: Not on file   • Highest education level: Not on file   Tobacco Use   • Smoking status: Former Smoker     Packs/day: 0.50     Years: 15.00     Pack years: 7.50     Types: Cigarettes     Quit date: 6/15/1981     Years since quittin.3   • Smokeless tobacco: Former User     Types: Chew     Quit date: 6/15/1961   Substance and Sexual Activity   • Alcohol use: No   • Drug use: No   • Sexual activity: Defer       Family History:     Family History   Problem Relation Age of Onset   • No Known Problems Father    • No Known Problems Mother        Review of Systems:     Review of Systems   Constitutional: Negative.    HENT: Negative.    Eyes: Negative.    Respiratory: Negative.    Cardiovascular: Negative.    Gastrointestinal: Negative.    Endocrine: Negative.    Musculoskeletal: Negative.    Allergic/Immunologic: Negative.    Neurological: Negative.    Hematological: Negative.    Psychiatric/Behavioral: Negative.        Physical Exam:     Physical Exam  Vitals and nursing note reviewed.   Constitutional:       Appearance: He is well-developed.   HENT:      Head: Normocephalic and  atraumatic.   Eyes:      Conjunctiva/sclera: Conjunctivae normal.      Pupils: Pupils are equal, round, and reactive to light.   Cardiovascular:      Rate and Rhythm: Normal rate and regular rhythm.      Heart sounds: Normal heart sounds.   Pulmonary:      Effort: Pulmonary effort is normal.      Breath sounds: Normal breath sounds.   Abdominal:      General: Bowel sounds are normal.      Palpations: Abdomen is soft.   Musculoskeletal:         General: Normal range of motion.      Cervical back: Normal range of motion.   Skin:     General: Skin is warm and dry.   Neurological:      Mental Status: He is alert and oriented to person, place, and time.      Deep Tendon Reflexes: Reflexes are normal and symmetric.   Psychiatric:         Behavior: Behavior normal.         Thought Content: Thought content normal.         Judgment: Judgment normal.         I have reviewed the following portions of the patient's history: allergies, current medications, past family history, past medical history, past social history, past surgical history, problem list and ROS and confirm it's accurate.      Procedure:       Assessment/Plan:   Prostate cancer:  He returns today status post biopsy for extensive discussion of prostate cancer.  We discussed staging, and grading of the disease.  I described with a Tamie system being from a 2-10 scale with the most common low-grade pattern being a [place] 6.  I discussed the staging workup including a total body bone scan and CT scan especially with a PSA is greater than 10.  We discussed the various options at length I discussed a radical retropubic prostatectomy done in the traditional fashion and using the robotic technique.  I then discussed radiation treatment both seed therapy and external beam therapy using Gold fiduciary markers.  We talked about some of the other alternatives such as a cryosurgical ablation at high intensity focused ultrasound as being viable alternatives but not  recommended at this time.  He focused on aggressive watchful waiting and explained that currently low literature it's been discovered that a lot of men can actually observe it especially with numerous other comorbidities cannot have problems from the cancer by itself.  Talked about hormonal ablation.  In the side effects of creation of insulin resistance.  Overall, the patient was given appropriate literature is going to think about it and I'm going to revisit the topic with them after her next office visit  His PSA remains castrate, he has no leakage, no evidence of radiation cystitis or proctitis.  PSA testing-I am recommending a PSA blood test that stands for prostate specific antigen.  I discussed the pathophysiology of PSA testing indicating its use in the diagnosis and management of prostate cancer.  I discussed the normal range being 0-4 but more appropriately being much closer to 0-2 in a normal male.  I discussed the fact that after certain age we don't recommend PSA testing especially in view of numerous comorbidities.  That this will not be a useful test.  I discussed many of the things that can artificially raised PSA including a recent infection, urinary tract infection, recent sexual intercourse.  Where even the type of movement such as manipulation of the prostate  riding a bicycle.  After all this is taken into account when the test is reviewed  the most important use of PSA which is the velocity measurement in other words the change of PSA with time as a very important factor in the use and that we look for greater than 20% rise over a year to help us make the prediction of prostate cancer.  I also discussed that the use with prostate cancer indicating that after a radical prostatectomy the PSA should be 0 and any rise indicates an early biochemical recurrence  Erectile dysfunction-we discussed the anatomy and physiology of the penis and the endothelium.  We discussed the various forms of erectile  dysfunction including peripheral vascular occlusive disease, postoperative, secondary to radiation treatments of the prostate, and arterial inflow.  We discussed the various treatment options available including oral medication and its various forms.  We discussed the use of both generic and non-generic Viagra.  We discussed Cialis and a longer half-life of 17 hours as well as the other 2 medications.  We discussed cost involved with this including the fact that the generic is much cheaper but is taken has multiple pills because they are 20 mg dosages.  We did discuss the other alternatives including Penile injections, vacuum erection devices and surgical intervention reserved for only the most severe cases.  We discussed the need for testosterone in about 20% of cases of erectile dysfunction.  Start Viagra per his request                This document has been electronically signed by BONG ORTIZ MD October 5, 2021 14:09 EDT

## 2021-10-10 RX ORDER — SILDENAFIL 100 MG/1
100 TABLET, FILM COATED ORAL AS NEEDED
Qty: 20 TABLET | Refills: 1 | Status: SHIPPED | OUTPATIENT
Start: 2021-10-10 | End: 2022-11-10

## 2021-11-10 ENCOUNTER — HOSPITAL ENCOUNTER (OUTPATIENT)
Dept: HOSPITAL 79 - OR | Age: 86
Discharge: HOME | End: 2021-11-10
Attending: INTERNAL MEDICINE
Payer: MEDICARE

## 2021-11-10 VITALS — HEIGHT: 68 IN | WEIGHT: 165 LBS | BODY MASS INDEX: 25.01 KG/M2

## 2021-11-10 DIAGNOSIS — Z88.5: ICD-10-CM

## 2021-11-10 DIAGNOSIS — I10: ICD-10-CM

## 2021-11-10 DIAGNOSIS — Z79.51: ICD-10-CM

## 2021-11-10 DIAGNOSIS — K21.00: ICD-10-CM

## 2021-11-10 DIAGNOSIS — E78.5: ICD-10-CM

## 2021-11-10 DIAGNOSIS — K22.5: ICD-10-CM

## 2021-11-10 DIAGNOSIS — Z79.899: ICD-10-CM

## 2021-11-10 DIAGNOSIS — N20.0: ICD-10-CM

## 2021-11-10 DIAGNOSIS — K31.819: ICD-10-CM

## 2021-11-10 DIAGNOSIS — E66.3: ICD-10-CM

## 2021-11-10 DIAGNOSIS — K22.2: ICD-10-CM

## 2021-11-10 DIAGNOSIS — Z98.890: ICD-10-CM

## 2021-11-10 DIAGNOSIS — Z90.49: ICD-10-CM

## 2021-11-10 DIAGNOSIS — K31.5: ICD-10-CM

## 2021-11-10 DIAGNOSIS — K31.89: Primary | ICD-10-CM

## 2021-11-10 DIAGNOSIS — Z20.822: ICD-10-CM

## 2021-11-10 PROCEDURE — 0DB68ZX EXCISION OF STOMACH, VIA NATURAL OR ARTIFICIAL OPENING ENDOSCOPIC, DIAGNOSTIC: ICD-10-PCS | Performed by: INTERNAL MEDICINE

## 2021-11-10 PROCEDURE — 0DB78ZX EXCISION OF STOMACH, PYLORUS, VIA NATURAL OR ARTIFICIAL OPENING ENDOSCOPIC, DIAGNOSTIC: ICD-10-PCS | Performed by: INTERNAL MEDICINE

## 2021-11-18 ENCOUNTER — OFFICE VISIT (OUTPATIENT)
Dept: CARDIOLOGY | Facility: CLINIC | Age: 86
End: 2021-11-18

## 2021-11-18 VITALS
OXYGEN SATURATION: 97 % | TEMPERATURE: 98.2 F | SYSTOLIC BLOOD PRESSURE: 132 MMHG | HEART RATE: 65 BPM | DIASTOLIC BLOOD PRESSURE: 60 MMHG | WEIGHT: 171 LBS | BODY MASS INDEX: 25.91 KG/M2 | HEIGHT: 68 IN

## 2021-11-18 DIAGNOSIS — E78.5 HYPERLIPIDEMIA, UNSPECIFIED HYPERLIPIDEMIA TYPE: Primary | ICD-10-CM

## 2021-11-18 DIAGNOSIS — I10 ESSENTIAL HYPERTENSION: ICD-10-CM

## 2021-11-18 DIAGNOSIS — I71.40 AAA (ABDOMINAL AORTIC ANEURYSM) WITHOUT RUPTURE (HCC): ICD-10-CM

## 2021-11-18 DIAGNOSIS — E11.9 TYPE 2 DIABETES MELLITUS WITHOUT COMPLICATION, WITHOUT LONG-TERM CURRENT USE OF INSULIN (HCC): ICD-10-CM

## 2021-11-18 PROCEDURE — 99213 OFFICE O/P EST LOW 20 MIN: CPT | Performed by: NURSE PRACTITIONER

## 2021-11-18 RX ORDER — DAPAGLIFLOZIN 5 MG/1
5 TABLET, FILM COATED ORAL DAILY
COMMUNITY
Start: 2021-11-04

## 2021-11-18 NOTE — PROGRESS NOTES
Subjective   Celso Mcelroy is a 85 y.o. male.   Chief Complaint   Patient presents with   • Hypertension     follow up   • Chest Pain     follow up      History of Present Illness   Celso Mcelroy is an 85-year-old  male who presents to the clinic today for routine follow-up    He states overall he is doing well.  He has had no further episodes of chest pain or exertional dyspnea.  He continues to walk on a daily basis he denies leg pain.  Blood pressure is well controlled on valsartan 320 mg daily and Norvasc 2.5 mg daily.  Denies hypotension or bradycardia.    Hyperlipidemia on Zocor 20 mg nightly and TriCor 145 mg daily.  He denies myalgias.     DM recently noted on Farxiga.  PCP is managing    He recently saw Dr. Celeste for his abdominal aortic aneurysm with recommendations to repeat a CT angiogram in 1 year and follow-up with Dr. Celeste.  History of a abdominal aortic aneurysm with repair at the [facility] in 2001.      His son reports he recently had some GI issues and was further evaluated and this seems to have resolved.     Has a history of prostate cancer and recently follow-up with urology with a good checkup.    The following portions of the patient's history were reviewed and updated as appropriate: allergies, current medications, past family history, past medical history, past social history, past surgical history and problem list.    Current Outpatient Medications:   •  acetaminophen (TYLENOL) 650 MG 8 hr tablet, Take 650 mg by mouth Every 8 (Eight) Hours As Needed for mild pain (1-3)., Disp: , Rfl:   •  amLODIPine (NORVASC) 2.5 MG tablet, 2.5 mg Every Evening., Disp: , Rfl:   •  Farxiga 5 MG tablet tablet, Take 5 mg by mouth Daily., Disp: , Rfl:   •  fenofibrate (TRICOR) 145 MG tablet, Take 145 mg by mouth Daily., Disp: , Rfl:   •  Rocklatan 0.02-0.005 % solution, Daily., Disp: , Rfl:   •  sildenafil (Viagra) 100 MG tablet, Take 1 tablet by mouth As Needed for Erectile  "Dysfunction., Disp: 20 tablet, Rfl: 1  •  simvastatin (ZOCOR) 20 MG tablet, Take 20 mg by mouth Every Night., Disp: , Rfl:   •  valsartan (DIOVAN) 320 MG tablet, Take 320 mg by mouth., Disp: , Rfl:     Review of Systems   Constitutional: Negative for activity change, appetite change, chills, diaphoresis, fatigue and fever.   HENT: Negative for congestion, drooling, ear discharge, ear pain, mouth sores, nosebleeds, postnasal drip, rhinorrhea, sinus pressure, sneezing and sore throat.    Eyes: Negative for pain, discharge and visual disturbance.   Respiratory: Negative for cough, chest tightness, shortness of breath and wheezing.    Cardiovascular: Negative for chest pain, palpitations and leg swelling.   Gastrointestinal: Negative for abdominal pain, constipation, diarrhea, nausea and vomiting.   Endocrine: Negative for cold intolerance, heat intolerance, polydipsia, polyphagia and polyuria.   Musculoskeletal: Negative for arthralgias, myalgias and neck pain.   Skin: Negative for rash and wound.   Neurological: Negative for syncope, speech difficulty, weakness, light-headedness and headaches.   Hematological: Negative for adenopathy. Does not bruise/bleed easily.   Psychiatric/Behavioral: Negative for confusion, dysphoric mood and sleep disturbance. The patient is not nervous/anxious.    All other systems reviewed and are negative.    /60 (BP Location: Left arm, Patient Position: Sitting, Cuff Size: Adult)   Pulse 65   Temp 98.2 °F (36.8 °C)   Ht 172.7 cm (68\")   Wt 77.6 kg (171 lb)   SpO2 97%   BMI 26.00 kg/m²     Objective   Allergies   Allergen Reactions   • Percocet [Oxycodone-Acetaminophen] Nausea Only   • Codeine Other (See Comments)     headaches   • Morphine And Related Hallucinations       Physical Exam  Vitals reviewed.   Constitutional:       Appearance: Normal appearance. He is well-developed.   HENT:      Head: Normocephalic.   Eyes:      Conjunctiva/sclera: Conjunctivae normal.      " Comments: Wears Glasses   Neck:      Thyroid: No thyromegaly.      Vascular: No carotid bruit or JVD.   Cardiovascular:      Rate and Rhythm: Normal rate and regular rhythm.   Pulmonary:      Effort: Pulmonary effort is normal.      Breath sounds: Normal breath sounds.   Abdominal:      General: Bowel sounds are normal.      Palpations: Abdomen is soft. There is no hepatomegaly, splenomegaly or mass.      Tenderness: There is no abdominal tenderness.   Musculoskeletal:      Cervical back: Normal range of motion and neck supple.      Right lower leg: No edema.      Left lower leg: No edema.   Skin:     General: Skin is warm and dry.   Neurological:      Mental Status: He is alert and oriented to person, place, and time.   Psychiatric:         Attention and Perception: Attention normal.         Mood and Affect: Mood normal.         Speech: Speech normal.         Behavior: Behavior normal. Behavior is cooperative.         Cognition and Memory: Cognition normal.         Assessment/Plan   Diagnoses and all orders for this visit:    1. Hyperlipidemia, unspecified hyperlipidemia type (Primary)  Continue on current medications, request labs from PCP     2. Essential hypertension  Controlled, will plan to continue on current medications    3. Type 2 diabetes mellitus without complication, without long-term current use of insulin (HCC)  Managed by PCP    4. AAA (abdominal aortic aneurysm) without rupture (HCC)  Following with cardiothoracic surgeon      Request labs from PCP  Follow-up in 6 months, sooner if needed

## 2022-04-05 ENCOUNTER — OFFICE VISIT (OUTPATIENT)
Dept: UROLOGY | Facility: CLINIC | Age: 87
End: 2022-04-05

## 2022-04-05 VITALS — HEIGHT: 68 IN | WEIGHT: 171.08 LBS | BODY MASS INDEX: 25.93 KG/M2

## 2022-04-05 DIAGNOSIS — C61 PROSTATE CANCER: Primary | ICD-10-CM

## 2022-04-05 PROCEDURE — 99213 OFFICE O/P EST LOW 20 MIN: CPT | Performed by: UROLOGY

## 2022-04-05 RX ORDER — CLOTRIMAZOLE AND BETAMETHASONE DIPROPIONATE 10; .64 MG/G; MG/G
1 CREAM TOPICAL 2 TIMES DAILY
Qty: 45 G | Refills: 2 | Status: SHIPPED | OUTPATIENT
Start: 2022-04-05 | End: 2022-11-10

## 2022-04-05 NOTE — PROGRESS NOTES
Chief Complaint:          Chief Complaint   Patient presents with   • Prostate Cancer       HPI:   86 y.o. male returns today.  Status post CyberKnife 2 years ago last PSA was less than 0.01.  A1c is 6.9.  Uses Viagra and is very pleased with it.  He reports no lower urinary tract symptomatology, particularly irritative symptoms such as frequency, urgency, dysuria, and obstructive symptomatology, particularly dribbling, hesitancy, and intermittency.      Past Medical History:        Past Medical History:   Diagnosis Date   • AAA (abdominal aortic aneurysm) (HCC)    • Arthritis    • Cancer (HCC)    • Coronary artery disease    • Elevated cholesterol    • Glaucoma    • Hypertension    • Prostate cancer (HCC)    • Skin cancer          Current Meds:     Current Outpatient Medications   Medication Sig Dispense Refill   • acetaminophen (TYLENOL) 650 MG 8 hr tablet Take 650 mg by mouth Every 8 (Eight) Hours As Needed for mild pain (1-3).     • amLODIPine (NORVASC) 2.5 MG tablet 2.5 mg Every Evening.     • Farxiga 5 MG tablet tablet Take 5 mg by mouth Daily.     • fenofibrate (TRICOR) 145 MG tablet Take 145 mg by mouth Daily.     • Rocklatan 0.02-0.005 % solution Daily.     • sildenafil (Viagra) 100 MG tablet Take 1 tablet by mouth As Needed for Erectile Dysfunction. 20 tablet 1   • simvastatin (ZOCOR) 20 MG tablet Take 20 mg by mouth Every Night.     • valsartan (DIOVAN) 320 MG tablet Take 320 mg by mouth.       No current facility-administered medications for this visit.        Allergies:      Allergies   Allergen Reactions   • Percocet [Oxycodone-Acetaminophen] Nausea Only   • Codeine Other (See Comments)     headaches   • Morphine And Related Hallucinations        Past Surgical History:     Past Surgical History:   Procedure Laterality Date   • ABDOMINAL AORTIC ANEURYSM REPAIR     • ABDOMINAL AORTIC ANEURYSM REPAIR     • ABDOMINAL AORTIC ANEURYSM REPAIR     • BACK SURGERY      cervical   • COLONOSCOPY  05/2019   •  CYBERKNIFE  2020    Prostate   • ENDOSCOPY     • EYE SURGERY Bilateral     iol-Cataract and Scar Tissue removed right eye   • GALLBLADDER SURGERY     • PROSTATE FIDUCIAL MARKER PLACEMENT N/A 2019    Procedure: PROSTATE FIDUCIAL MARKER PLACEMENT WITH 6 MARKERS;  Surgeon: Tom Pineda MD;  Location: [facility];  Service: Urology         Social History:     Social History     Socioeconomic History   • Marital status:    • Number of children: 3   Tobacco Use   • Smoking status: Former Smoker     Packs/day: 0.50     Years: 15.00     Pack years: 7.50     Types: Cigarettes     Quit date: 6/15/1981     Years since quittin.8   • Smokeless tobacco: Former User     Types: Chew     Quit date: 6/15/1961   Substance and Sexual Activity   • Alcohol use: No   • Drug use: No   • Sexual activity: Defer       Family History:     Family History   Problem Relation Age of Onset   • No Known Problems Father    • No Known Problems Mother        Review of Systems:     Review of Systems   Constitutional: Negative.    HENT: Negative.    Eyes: Negative.    Respiratory: Negative.    Cardiovascular: Negative.    Gastrointestinal: Negative.    Endocrine: Negative.    Musculoskeletal: Negative.    Allergic/Immunologic: Negative.    Neurological: Negative.    Hematological: Negative.    Psychiatric/Behavioral: Negative.        Physical Exam:     Physical Exam  Vitals and nursing note reviewed.   Constitutional:       Appearance: He is well-developed.   HENT:      Head: Normocephalic and atraumatic.   Eyes:      Conjunctiva/sclera: Conjunctivae normal.      Pupils: Pupils are equal, round, and reactive to light.   Cardiovascular:      Rate and Rhythm: Normal rate and regular rhythm.      Heart sounds: Normal heart sounds.   Pulmonary:      Effort: Pulmonary effort is normal.      Breath sounds: Normal breath sounds.   Abdominal:      General: Bowel sounds are normal.      Palpations: Abdomen is soft.    Musculoskeletal:         General: Normal range of motion.      Cervical back: Normal range of motion.   Skin:     General: Skin is warm and dry.   Neurological:      Mental Status: He is alert and oriented to person, place, and time.      Deep Tendon Reflexes: Reflexes are normal and symmetric.   Psychiatric:         Behavior: Behavior normal.         Thought Content: Thought content normal.         Judgment: Judgment normal.         I have reviewed the following portions of the patient's history: Allergies, current medications, past family history, past medical history, past social history, past surgical history, problem list, and ROS and confirm it is accurate.      Procedure:       Assessment/Plan:   Prostate cancer:  He returns today status post biopsy for extensive discussion of prostate cancer.  We discussed staging and grading of the disease.  I described with a Tamie system being from a 2 to10 scale with the most common low-grade pattern being a Tamie 6.  I discussed the staging workup including a total body bone scan and CT scan especially with a PSA greater than 10.  We discussed the various options at length. I discussed a radical retropubic prostatectomy done in the traditional fashion and using the robotic technique.  I then discussed radiation treatment both seed therapy and external beam therapy using Gold fiduciary markers.  We talked about some of the other alternatives such as a cryosurgical ablation at high intensity focused ultrasound as being viable alternatives but not recommended at this time.  He focused on aggressive watchful waiting and explained that currently low literature it's been discovered that a lot of men can actually observe it especially with numerous other comorbidities cannot have problems from the cancer by itself.  Talked about hormonal ablation and the side effects of creation of insulin resistance.  Overall, the patient was given appropriate literature, is going to  think about it, and I'm going to revisit the topic with them after the next office visit.  Doing fantastic  Erectile dysfunction-we discussed the anatomy and physiology of the penis and the endothelium.  We discussed the various forms of erectile dysfunction including peripheral vascular occlusive disease, postoperative, secondary to radiation treatments of the prostate, and arterial inflow.  We discussed the various treatment options available including oral medication and its various forms.  We discussed the use of both generic and non-generic Viagra.  We discussed Cialis and a longer half-life of 17 hours as well as the other 2 medications.  We discussed cost involved with this including the fact that the generic is much cheaper but is taken as multiple pills because they are 20 mg dosages.  We did discuss the other alternatives including penile injections, vacuum erection devices and surgical intervention reserved for only the most severe cases.  We discussed the need for testosterone in about 20% of cases of erectile dysfunction.  Continue Viagra              This document has been electronically signed by BONG ORTIZ MD April 5, 2022 15:13 EDT

## 2022-05-12 ENCOUNTER — OFFICE VISIT (OUTPATIENT)
Dept: CARDIOLOGY | Facility: CLINIC | Age: 87
End: 2022-05-12

## 2022-05-12 VITALS
HEART RATE: 62 BPM | BODY MASS INDEX: 25.01 KG/M2 | HEIGHT: 68 IN | OXYGEN SATURATION: 97 % | SYSTOLIC BLOOD PRESSURE: 126 MMHG | DIASTOLIC BLOOD PRESSURE: 62 MMHG | TEMPERATURE: 97.1 F | WEIGHT: 165 LBS

## 2022-05-12 DIAGNOSIS — I10 ESSENTIAL HYPERTENSION: Primary | ICD-10-CM

## 2022-05-12 DIAGNOSIS — I71.40 AAA (ABDOMINAL AORTIC ANEURYSM) WITHOUT RUPTURE: ICD-10-CM

## 2022-05-12 DIAGNOSIS — E78.5 HYPERLIPIDEMIA, UNSPECIFIED HYPERLIPIDEMIA TYPE: ICD-10-CM

## 2022-05-12 PROCEDURE — 99213 OFFICE O/P EST LOW 20 MIN: CPT | Performed by: NURSE PRACTITIONER

## 2022-05-20 ENCOUNTER — HOSPITAL ENCOUNTER (OUTPATIENT)
Dept: HOSPITAL 79 - KOH-I | Age: 87
End: 2022-05-20
Attending: FAMILY MEDICINE
Payer: MEDICARE

## 2022-05-20 DIAGNOSIS — R63.4: Primary | ICD-10-CM

## 2022-05-27 NOTE — PROGRESS NOTES
Subjective   Celso Mcelroy is a 86 y.o. male.   Chief Complaint   Patient presents with   • Hypertension     Follow up      History of Present Illness   Celso Mcelroy is an 86 y.o.  male who presents to the clinic today for routine follow up. He is accompanied by his son.     He states overall he is doing well.  He denies chest pain, palpitations or dyspnea. He continues to walk on a daily basis.     Hypertension is well controlled on valsartan 320 mg daily and Norvasc 2.5 mg daily. Intermittent monitoring with normal readings. Denies hypotension.      Hyperlipidemia on Zocor 20 mg nightly and TriCor 145 mg daily.  He denies myalgias. Feels he is tolerating his medications well. No recent labs available for review.       He is due to see Dr. Celeste for his yearly abdominal aortic aneurysm check. He denies abdominal or back pain. History of a abdominal aortic aneurysm repair at the [facility] in 2001.         The following portions of the patient's history were reviewed and updated as appropriate: allergies, current medications, past family history, past medical history, past social history, past surgical history and problem list.    Current Outpatient Medications:   •  acetaminophen (TYLENOL) 650 MG 8 hr tablet, Take 650 mg by mouth Every 8 (Eight) Hours As Needed for mild pain (1-3)., Disp: , Rfl:   •  amLODIPine (NORVASC) 2.5 MG tablet, 2.5 mg Every Evening., Disp: , Rfl:   •  clotrimazole-betamethasone (Lotrisone) 1-0.05 % cream, Apply 1 application topically to the appropriate area as directed 2 (Two) Times a Day., Disp: 45 g, Rfl: 2  •  Farxiga 5 MG tablet tablet, Take 5 mg by mouth Daily., Disp: , Rfl:   •  fenofibrate (TRICOR) 145 MG tablet, Take 145 mg by mouth Daily., Disp: , Rfl:   •  Rocklatan 0.02-0.005 % solution, Daily., Disp: , Rfl:   •  sildenafil (Viagra) 100 MG tablet, Take 1 tablet by mouth As Needed for Erectile Dysfunction., Disp: 20 tablet, Rfl: 1  •  simvastatin (ZOCOR)  20 MG tablet, Take 20 mg by mouth Every Night., Disp: , Rfl:   •  valsartan (DIOVAN) 320 MG tablet, Take 320 mg by mouth., Disp: , Rfl:     Review of Systems   Constitutional: Negative for activity change, appetite change, chills, diaphoresis, fatigue and fever.   HENT: Negative for congestion, drooling, ear discharge, ear pain, mouth sores, nosebleeds, postnasal drip, rhinorrhea, sinus pressure, sneezing and sore throat.    Eyes: Negative for pain, discharge and visual disturbance.   Respiratory: Negative for cough, chest tightness, shortness of breath and wheezing.    Cardiovascular: Negative for chest pain, palpitations and leg swelling.   Gastrointestinal: Negative for abdominal pain, constipation, diarrhea, nausea and vomiting.   Endocrine: Negative for cold intolerance, heat intolerance, polydipsia, polyphagia and polyuria.   Musculoskeletal: Negative for arthralgias, myalgias and neck pain.   Skin: Negative for rash and wound.   Neurological: Negative for syncope, speech difficulty, weakness, light-headedness and headaches.   Hematological: Negative for adenopathy. Does not bruise/bleed easily.   Psychiatric/Behavioral: Negative for confusion, dysphoric mood and sleep disturbance. The patient is not nervous/anxious.    All other systems reviewed and are negative.    Patient Active Problem List   Diagnosis   • Nocturia   • Prostate cancer (HCC)   • Erectile dysfunction   • AAA (abdominal aortic aneurysm) without rupture (HCC)     Past Medical History:   Diagnosis Date   • AAA (abdominal aortic aneurysm) (HCC)    • Arthritis    • Cancer (HCC)    • Coronary artery disease    • Elevated cholesterol    • Glaucoma    • Hypertension    • Prostate cancer (HCC)    • Skin cancer      Past Surgical History:   Procedure Laterality Date   • ABDOMINAL AORTIC ANEURYSM REPAIR     • ABDOMINAL AORTIC ANEURYSM REPAIR     • ABDOMINAL AORTIC ANEURYSM REPAIR     • BACK SURGERY      cervical   • COLONOSCOPY  05/2019   • CYBERKNIFE   "01/17/2020    Prostate   • ENDOSCOPY     • EYE SURGERY Bilateral     iol-Cataract and Scar Tissue removed right eye   • GALLBLADDER SURGERY     • PROSTATE FIDUCIAL MARKER PLACEMENT N/A 12/16/2019    Procedure: PROSTATE FIDUCIAL MARKER PLACEMENT WITH 6 MARKERS;  Surgeon: Tom Pineda MD;  Location: [facility];  Service: Urology     /62 (BP Location: Left arm, Patient Position: Sitting, Cuff Size: Adult)   Pulse 62   Temp 97.1 °F (36.2 °C)   Ht 172.7 cm (68\")   Wt 74.8 kg (165 lb)   SpO2 97%   BMI 25.09 kg/m²     Objective   Allergies   Allergen Reactions   • Percocet [Oxycodone-Acetaminophen] Nausea Only   • Codeine Other (See Comments)     headaches   • Morphine And Related Hallucinations     Physical Exam  Vitals reviewed.   Constitutional:       Appearance: Normal appearance. He is well-developed.   HENT:      Head: Normocephalic.   Eyes:      Conjunctiva/sclera: Conjunctivae normal.   Neck:      Thyroid: No thyromegaly.      Vascular: No carotid bruit or JVD.   Cardiovascular:      Rate and Rhythm: Normal rate and regular rhythm.   Pulmonary:      Effort: Pulmonary effort is normal.      Breath sounds: Normal breath sounds.   Abdominal:      General: Bowel sounds are normal.      Palpations: Abdomen is soft. There is no hepatomegaly, splenomegaly or mass.      Tenderness: There is no abdominal tenderness.   Musculoskeletal:      Cervical back: Normal range of motion and neck supple.      Right lower leg: No edema.      Left lower leg: No edema.   Skin:     General: Skin is warm and dry.   Neurological:      Mental Status: He is alert and oriented to person, place, and time.   Psychiatric:         Attention and Perception: Attention normal.         Mood and Affect: Mood normal.         Speech: Speech normal.         Behavior: Behavior normal. Behavior is cooperative.         Cognition and Memory: Cognition normal.       Assessment & Plan   Diagnoses and all orders for this visit:    1. " Essential hypertension (Primary)  Controlled, will plan to continue current medications     2. Hyperlipidemia, unspecified hyperlipidemia type  Continue on Zocor, partial lab review of total cholesterol at 144 and triglycerides of 243. We discussed dosage changes to improve cholesterol but he doesn't wish to proceed. Will request labs from PCP.     3. AAA (abdominal aortic aneurysm) without rupture (HCC)  Keep follow up with cardiothoracic surgeon.       Follow up in 6 months, sooner if needed.

## 2022-06-17 ENCOUNTER — HOSPITAL ENCOUNTER (OUTPATIENT)
Dept: CT IMAGING | Facility: HOSPITAL | Age: 87
Discharge: HOME | End: 2022-06-17
Admitting: THORACIC SURGERY (CARDIOTHORACIC VASCULAR SURGERY)

## 2022-06-17 DIAGNOSIS — I71.40 AAA (ABDOMINAL AORTIC ANEURYSM) WITHOUT RUPTURE: ICD-10-CM

## 2022-06-17 LAB — CREAT BLDA-MCNC: 0.9 MG/DL (ref 0.6–1.3)

## 2022-06-17 PROCEDURE — 74174 CTA ABD&PLVS W/CONTRAST: CPT

## 2022-06-17 PROCEDURE — 74174 CTA ABD&PLVS W/CONTRAST: CPT | Performed by: RADIOLOGY

## 2022-06-17 PROCEDURE — 82565 ASSAY OF CREATININE: CPT

## 2022-06-17 PROCEDURE — 25010000002 IOPAMIDOL 61 % SOLUTION: Performed by: THORACIC SURGERY (CARDIOTHORACIC VASCULAR SURGERY)

## 2022-06-17 RX ADMIN — IOPAMIDOL 80 ML: 612 INJECTION, SOLUTION INTRAVENOUS at 10:32

## 2022-07-13 ENCOUNTER — OFFICE VISIT (OUTPATIENT)
Dept: CARDIAC SURGERY | Facility: CLINIC | Age: 87
End: 2022-07-13

## 2022-07-13 VITALS
HEIGHT: 67 IN | SYSTOLIC BLOOD PRESSURE: 149 MMHG | HEART RATE: 55 BPM | BODY MASS INDEX: 26.37 KG/M2 | DIASTOLIC BLOOD PRESSURE: 71 MMHG | TEMPERATURE: 97.1 F | WEIGHT: 168 LBS | OXYGEN SATURATION: 99 %

## 2022-07-13 DIAGNOSIS — I71.40 AAA (ABDOMINAL AORTIC ANEURYSM) WITHOUT RUPTURE: Primary | ICD-10-CM

## 2022-07-13 PROCEDURE — 99213 OFFICE O/P EST LOW 20 MIN: CPT | Performed by: NURSE PRACTITIONER

## 2022-07-13 NOTE — PROGRESS NOTES
[facility] Cardiothoracic Surgery Office Follow Up Note     Date of Encounter: 2022     Name: Celso Mcelroy  : 1935     Referred By: No ref. provider found  PCP: Emilie Rausch MD    Chief Complaint:    Chief Complaint   Patient presents with   • Follow-up     1 year follow up for an abdominal aortic aneurysm s/p endo repair in .       Subjective      History of Present Illness:    Celso Mcelroy is a 86 y.o. male former smoker with history of prostate cancer, HTN, HLD on statin therapy, and AAA s/p open [place] [facility] [place] [facility] in  who is initially referred to Dr. Celeste 2021 for surveillance.  Radiology report from CT imaging at this time interpreted a 3.4 infrarenal abdominal aortic aneurysm.  On Dr. Celeste's personal review this appeared to be a conduit overlying his native aortic bifurcation, however the study was limited without contrast.  CTA abdomen pelvis in 1 year was arranged which he presents for today.  Denies any unusual abdominal or back pain.  He reports good blood pressure control with average readings 120-129/60-65.     Review of Systems:  Review of Systems   Constitutional: Positive for night sweats. Negative for chills, decreased appetite, diaphoresis, fever, malaise/fatigue, weight gain and weight loss.   HENT: Negative.  Negative for hoarse voice.    Eyes: Negative.  Negative for blurred vision, double vision and visual disturbance.   Cardiovascular: Negative.  Negative for chest pain, claudication, dyspnea on exertion, irregular heartbeat, leg swelling, near-syncope, orthopnea, palpitations, paroxysmal nocturnal dyspnea and syncope.   Respiratory: Negative.  Negative for cough, hemoptysis, shortness of breath, sputum production and wheezing.    Hematologic/Lymphatic: Negative for adenopathy and bleeding problem. Bruises/bleeds easily.   Skin: Negative.  Negative for color change, nail changes, poor wound healing and rash.    Musculoskeletal: Negative.  Negative for back pain, falls and muscle cramps.   Gastrointestinal: Negative.  Negative for abdominal pain, dysphagia and heartburn.   Genitourinary: Negative.  Negative for flank pain.   Neurological: Negative.  Negative for brief paralysis, disturbances in coordination, dizziness, focal weakness, headaches, light-headedness, loss of balance, numbness, paresthesias, sensory change, vertigo and weakness.   Psychiatric/Behavioral: Negative.  Negative for depression and suicidal ideas.   Allergic/Immunologic: Positive for environmental allergies. Negative for persistent infections.       I have reviewed the following portions of the patient's history: allergies, current medications, past family history, past medical history, past social history, past surgical history and problem list and confirm it's accurate.    Allergies:  Allergies   Allergen Reactions   • Percocet [Oxycodone-Acetaminophen] Nausea Only   • Codeine Other (See Comments)     headaches   • Morphine And Related Hallucinations       Medications:      Current Outpatient Medications:   •  acetaminophen (TYLENOL) 650 MG 8 hr tablet, Take 650 mg by mouth Every 8 (Eight) Hours As Needed for mild pain (1-3)., Disp: , Rfl:   •  amLODIPine (NORVASC) 2.5 MG tablet, 2.5 mg Every Evening., Disp: , Rfl:   •  clotrimazole-betamethasone (Lotrisone) 1-0.05 % cream, Apply 1 application topically to the appropriate area as directed 2 (Two) Times a Day., Disp: 45 g, Rfl: 2  •  Farxiga 5 MG tablet tablet, Take 5 mg by mouth Daily., Disp: , Rfl:   •  fenofibrate (TRICOR) 145 MG tablet, Take 145 mg by mouth Daily., Disp: , Rfl:   •  Rocklatan 0.02-0.005 % solution, Daily., Disp: , Rfl:   •  simvastatin (ZOCOR) 20 MG tablet, Take 20 mg by mouth Every Night., Disp: , Rfl:   •  valsartan (DIOVAN) 320 MG tablet, Take 320 mg by mouth., Disp: , Rfl:   •  sildenafil (Viagra) 100 MG tablet, Take 1 tablet by mouth As Needed for Erectile Dysfunction.  "(Patient not taking: Reported on 2022), Disp: 20 tablet, Rfl: 1    History:   Past Medical History:   Diagnosis Date   • AAA (abdominal aortic aneurysm) (HCC)    • Arthritis    • Cancer (HCC)    • Coronary artery disease    • Elevated cholesterol    • Glaucoma    • Hypertension    • Prostate cancer (HCC)    • Skin cancer        Past Surgical History:   Procedure Laterality Date   • ABDOMINAL AORTIC ANEURYSM REPAIR     • ABDOMINAL AORTIC ANEURYSM REPAIR     • ABDOMINAL AORTIC ANEURYSM REPAIR     • BACK SURGERY      cervical   • COLONOSCOPY  2019   • CYBERKNIFE  2020    Prostate   • ENDOSCOPY     • EYE SURGERY Bilateral     iol-Cataract and Scar Tissue removed right eye   • GALLBLADDER SURGERY     • PROSTATE FIDUCIAL MARKER PLACEMENT N/A 2019    Procedure: PROSTATE FIDUCIAL MARKER PLACEMENT WITH 6 MARKERS;  Surgeon: Tom Pineda MD;  Location: [facility];  Service: Urology       Social History     Socioeconomic History   • Marital status:    • Number of children: 3   Tobacco Use   • Smoking status: Former Smoker     Packs/day: 0.50     Years: 15.00     Pack years: 7.50     Types: Cigarettes     Quit date: 6/15/1981     Years since quittin.1   • Smokeless tobacco: Former User     Types: Chew     Quit date: 6/15/1961   Vaping Use   • Vaping Use: Never used   Substance and Sexual Activity   • Alcohol use: No   • Drug use: No   • Sexual activity: Defer        Family History   Problem Relation Age of Onset   • No Known Problems Father    • No Known Problems Mother        Objective   Physical Exam:  Vitals:    22 1040 22 1041   BP: 153/72 149/71   BP Location: Left arm Right arm   Patient Position: Sitting Sitting   Pulse: 55    Temp: 97.1 °F (36.2 °C)    SpO2: 99%    Weight: 76.2 kg (168 lb)    Height: 170.2 cm (67\")       Body mass index is 26.31 kg/m².    Physical Exam  Constitutional:       Appearance: Normal appearance.   Cardiovascular:      Rate and Rhythm: Normal " rate.   Pulmonary:      Effort: Pulmonary effort is normal.   Skin:     General: Skin is warm and dry.   Neurological:      Mental Status: He is alert and oriented to person, place, and time. Mental status is at baseline.         Imaging/Labs:  CT Angiogram Abdomen Pelvis- Result Date: 6/17/2022  1. A 3.2 cm abdominal aortic aneurysm. 2. Nonobstructing stones in both kidneys. 3. Other findings as above.  This report was finalized on 6/17/2022 10:50 AM by Dr. Alexander London MD.       CT abdomen pelvis without contrast ([facility])- 3/5/2021  There is a 3.4 cm infrarenal abdominal aortic aneurysm      Assessment / Plan      Assessment / Plan:  Diagnoses and all orders for this visit:    1. AAA (abdominal aortic aneurysm) without rupture s/p open repair 2001 (Primary)       · [place] [facility] [place] [facility] at the [facility] in 2001 who is initially referred to Dr. Celeste 6/2021 for surveillance.    · Radiology report from 3/2021 CT imaging interpreted a 3.4 infrarenal abdominal aortic aneurysm.    · Dr. Celeste's personal review this appeared to be a conduit overlying his native aortic bifurcation, however the study was limited without contrast.   · CTA abdomen pelvis in 1 year was arranged which he presents for today.   · Asymptomatic with good blood pressure control  · Imaging personally reviewed, concur with radiology report of 3.2 cm measurement  · Continue medical management and annual surveillance with abdominal ultrasound in 1 year    Follow Up:   Return in about 1 year (around 7/13/2023) for Imaging next visit: abdominal u/s.   Or sooner for any further concerns or worsening sign and symptoms. If unable to reach us in the office please dial 911 or go to the nearest emergency department.      Maya COUGHLIN  [facility] Cardiothoracic Surgery    Time Spent: I spent 28 minutes caring for Celso on this date of service. This time includes time spent by me in the following activities: preparing  for the visit, reviewing tests, obtaining and/or reviewing a separately obtained history, performing a medically appropriate examination and/or evaluation, counseling and educating the patient/family/caregiver, ordering medications, tests, or procedures, documenting information in the medical record, independently interpreting results and communicating that information with the patient/family/caregiver and care coordination.

## 2022-11-10 ENCOUNTER — OFFICE VISIT (OUTPATIENT)
Dept: CARDIOLOGY | Facility: CLINIC | Age: 87
End: 2022-11-10

## 2022-11-10 VITALS
HEART RATE: 69 BPM | DIASTOLIC BLOOD PRESSURE: 60 MMHG | BODY MASS INDEX: 27 KG/M2 | WEIGHT: 172 LBS | HEIGHT: 67 IN | OXYGEN SATURATION: 97 % | SYSTOLIC BLOOD PRESSURE: 138 MMHG

## 2022-11-10 DIAGNOSIS — R25.2 LEG CRAMPS: ICD-10-CM

## 2022-11-10 DIAGNOSIS — I71.40 ABDOMINAL AORTIC ANEURYSM (AAA) WITHOUT RUPTURE, UNSPECIFIED PART: ICD-10-CM

## 2022-11-10 DIAGNOSIS — E78.5 HYPERLIPIDEMIA, UNSPECIFIED HYPERLIPIDEMIA TYPE: Primary | ICD-10-CM

## 2022-11-10 DIAGNOSIS — I10 ESSENTIAL HYPERTENSION: ICD-10-CM

## 2022-11-10 PROCEDURE — 99213 OFFICE O/P EST LOW 20 MIN: CPT | Performed by: NURSE PRACTITIONER

## 2022-11-10 RX ORDER — FLUTICASONE PROPIONATE 50 MCG
SPRAY, SUSPENSION (ML) NASAL AS NEEDED
COMMUNITY
Start: 2022-10-14

## 2022-11-10 RX ORDER — MULTIVIT-MIN/IRON/FOLIC ACID/K 18-600-40
CAPSULE ORAL DAILY
COMMUNITY
End: 2022-11-10

## 2022-11-10 RX ORDER — DIPHENOXYLATE HYDROCHLORIDE AND ATROPINE SULFATE 2.5; .025 MG/1; MG/1
1 TABLET ORAL DAILY
COMMUNITY

## 2022-11-17 LAB
ALBUMIN SERPL-MCNC: 5 G/DL (ref 3.6–4.6)
ALBUMIN/GLOB SERPL: 2.1 {RATIO} (ref 1.2–2.2)
ALP SERPL-CCNC: 60 IU/L (ref 44–121)
ALT SERPL-CCNC: 24 IU/L (ref 0–44)
AMBIG ABBREV CMP14 DEFAULT: NORMAL
AMBIG ABBREV LP DEFAULT: NORMAL
AST SERPL-CCNC: 31 IU/L (ref 0–40)
BILIRUB SERPL-MCNC: 0.5 MG/DL (ref 0–1.2)
BUN SERPL-MCNC: 19 MG/DL (ref 8–27)
BUN/CREAT SERPL: 18 (ref 10–24)
CALCIUM SERPL-MCNC: 9.9 MG/DL (ref 8.6–10.2)
CHLORIDE SERPL-SCNC: 99 MMOL/L (ref 96–106)
CHOLEST SERPL-MCNC: 145 MG/DL (ref 100–199)
CK SERPL-CCNC: 99 U/L (ref 30–208)
CO2 SERPL-SCNC: 25 MMOL/L (ref 20–29)
CREAT SERPL-MCNC: 1.03 MG/DL (ref 0.76–1.27)
EGFRCR SERPLBLD CKD-EPI 2021: 71 ML/MIN/1.73
GLOBULIN SER CALC-MCNC: 2.4 G/DL (ref 1.5–4.5)
GLUCOSE SERPL-MCNC: 134 MG/DL (ref 70–99)
HDLC SERPL-MCNC: 38 MG/DL
LDLC SERPL CALC-MCNC: 67 MG/DL (ref 0–99)
POTASSIUM SERPL-SCNC: 5.4 MMOL/L (ref 3.5–5.2)
PROT SERPL-MCNC: 7.4 G/DL (ref 6–8.5)
SODIUM SERPL-SCNC: 139 MMOL/L (ref 134–144)
TRIGL SERPL-MCNC: 249 MG/DL (ref 0–149)
VLDLC SERPL CALC-MCNC: 40 MG/DL (ref 5–40)

## 2022-11-18 ENCOUNTER — TELEPHONE (OUTPATIENT)
Dept: CARDIOLOGY | Facility: CLINIC | Age: 87
End: 2022-11-18

## 2022-11-18 NOTE — TELEPHONE ENCOUNTER
----- Message from CED Dangelo sent at 11/17/2022  9:39 AM EST -----  Reviewed labs   2. Cholesterol is stable, continue with current therapy   2. CK is normal   3. CMP - glucose is high and potassium is high, liver enzymes is normal. His PCP may have addressed elevated potassium? If not he needs to ensure he is not taking potassium supplement, low potassium diet and recommend recheck BMP tomorrow.

## 2022-11-18 NOTE — TELEPHONE ENCOUNTER
Contacted patient regarding results. Patient verbalized understanding and states he will make sure to discuss with his pcp

## 2022-12-11 NOTE — PROGRESS NOTES
Subjective     Celso Mcelroy is a 86 y.o. male.   Chief Complaint   Patient presents with   • Hypertension     Follow up    • leg cramps     amy     History of Present Illness   Celso Mcelroy is an 86 y.o. male who presents to the clinic today for follow up. He is accompanied by his son.     He states overall he is doing well.  He denies chest pain, palpitations or dyspnea. He continues to walk on a daily basis and denies any symptoms with activity. His biggest complaint is leg cramps. He has tried magnesium, hydration, electrolyte monitoring without significant improvement.      Hypertension is well controlled on valsartan 320 mg daily and Norvasc 2.5 mg daily. Intermittent monitoring with normal readings. Denies hypotension. Reports compliance with medications. Denies chest pain.      Hyperlipidemia on Zocor 20 mg nightly and TriCor 145 mg daily.  He denies myalgias. He had recent labs which he would like to discuss.        He is due to see Dr. Celeste for his yearly abdominal aortic aneurysm check. He denies abdominal or back pain. History of an abdominal aortic aneurysm repair at the [facility] in 2001.         Patient Active Problem List   Diagnosis   • Nocturia   • Prostate cancer (HCC)   • Erectile dysfunction   • AAA (abdominal aortic aneurysm) without rupture s/p open repair 2001     Past Medical History:   Diagnosis Date   • AAA (abdominal aortic aneurysm)    • Arthritis    • Cancer (HCC)    • Coronary artery disease    • Elevated cholesterol    • Glaucoma    • Hypertension    • Prostate cancer (HCC)    • Skin cancer      Past Surgical History:   Procedure Laterality Date   • ABDOMINAL AORTIC ANEURYSM REPAIR     • ABDOMINAL AORTIC ANEURYSM REPAIR     • ABDOMINAL AORTIC ANEURYSM REPAIR     • BACK SURGERY      cervical   • COLONOSCOPY  05/2019   • CYBERKNIFE  01/17/2020    Prostate   • ENDOSCOPY     • EYE SURGERY Bilateral     iol-Cataract and Scar Tissue removed right eye   • GALLBLADDER  SURGERY     • PROSTATE FIDUCIAL MARKER PLACEMENT N/A 2019    Procedure: PROSTATE FIDUCIAL MARKER PLACEMENT WITH 6 MARKERS;  Surgeon: Tom Pineda MD;  Location: [facility];  Service: Urology       Family History   Problem Relation Age of Onset   • No Known Problems Father    • No Known Problems Mother      Social History     Tobacco Use   • Smoking status: Former     Packs/day: 0.50     Years: 15.00     Pack years: 7.50     Types: Cigarettes     Quit date: 6/15/1981     Years since quittin.5   • Smokeless tobacco: Former     Types: Chew     Quit date: 6/15/1961   Vaping Use   • Vaping Use: Never used   Substance Use Topics   • Alcohol use: No   • Drug use: No         The following portions of the patient's history were reviewed and updated as appropriate: allergies, current medications, past family history, past medical history, past social history, past surgical history and problem list.    Allergies   Allergen Reactions   • Percocet [Oxycodone-Acetaminophen] Nausea Only   • Codeine Other (See Comments)     headaches   • Morphine And Related Hallucinations         Current Outpatient Medications:   •  acetaminophen (TYLENOL) 650 MG 8 hr tablet, Take 650 mg by mouth Every 8 (Eight) Hours As Needed for mild pain (1-3)., Disp: , Rfl:   •  amLODIPine (NORVASC) 2.5 MG tablet, 2.5 mg Every Evening., Disp: , Rfl:   •  Farxiga 5 MG tablet tablet, Take 5 mg by mouth Daily., Disp: , Rfl:   •  fenofibrate (TRICOR) 145 MG tablet, Take 145 mg by mouth Daily., Disp: , Rfl:   •  multivitamin (ONE-A-DAY 55 PLUS PO), Take 1 tablet by mouth Daily., Disp: , Rfl:   •  Rocklatan 0.02-0.005 % solution, Daily., Disp: , Rfl:   •  simvastatin (ZOCOR) 20 MG tablet, Take 20 mg by mouth Every Night., Disp: , Rfl:   •  valsartan (DIOVAN) 320 MG tablet, Take 320 mg by mouth., Disp: , Rfl:   •  fluticasone (FLONASE) 50 MCG/ACT nasal spray, As Needed., Disp: , Rfl:     Review of Systems   Constitutional: Negative for activity  "change, appetite change, chills, diaphoresis, fatigue and fever.   HENT: Negative for congestion, drooling, ear discharge, ear pain, mouth sores, nosebleeds, postnasal drip, rhinorrhea, sinus pressure, sneezing and sore throat.    Eyes: Negative for pain, discharge and visual disturbance.   Respiratory: Negative for cough, chest tightness, shortness of breath and wheezing.    Cardiovascular: Negative for chest pain, palpitations and leg swelling.   Gastrointestinal: Negative for abdominal pain, constipation, diarrhea, nausea and vomiting.   Endocrine: Negative for cold intolerance, heat intolerance, polydipsia, polyphagia and polyuria.   Musculoskeletal: Negative for arthralgias, myalgias and neck pain.   Skin: Negative for rash and wound.   Neurological: Negative for dizziness, syncope, speech difficulty, weakness, light-headedness and headaches.   Hematological: Negative for adenopathy. Does not bruise/bleed easily.   Psychiatric/Behavioral: Negative for confusion, dysphoric mood and sleep disturbance. The patient is not nervous/anxious.    All other systems reviewed and are negative.      /60 (BP Location: Left arm, Patient Position: Sitting, Cuff Size: Adult)   Pulse 69   Ht 170.2 cm (67\")   Wt 78 kg (172 lb)   SpO2 97%   BMI 26.94 kg/m²     Objective   Allergies   Allergen Reactions   • Percocet [Oxycodone-Acetaminophen] Nausea Only   • Codeine Other (See Comments)     headaches   • Morphine And Related Hallucinations       Physical Exam  Vitals reviewed.   Constitutional:       Appearance: Normal appearance. He is well-developed.      Comments: Uses a cane    HENT:      Head: Normocephalic.   Eyes:      Conjunctiva/sclera: Conjunctivae normal.   Neck:      Vascular: No JVD.   Cardiovascular:      Rate and Rhythm: Normal rate and regular rhythm.   Pulmonary:      Effort: Pulmonary effort is normal.      Breath sounds: Normal breath sounds.   Musculoskeletal:      Cervical back: Neck supple.      " Right lower leg: No edema.      Left lower leg: No edema.   Skin:     General: Skin is warm and dry.   Neurological:      Mental Status: He is alert and oriented to person, place, and time.   Psychiatric:         Attention and Perception: Attention normal.         Mood and Affect: Mood normal.         Speech: Speech normal.         Behavior: Behavior normal. Behavior is cooperative.         Cognition and Memory: Cognition normal.           LABS  CK  Order: [id number]   Status: Final result      Visible to patient: No (inaccessible in MyChart)      Next appt: 04/06/2023 at 01:40 PM in Urology (Austen Rosa MD)      1 Result Note     1 Follow-up Encounter  Component   Ref Range & Units 3 wk ago    Creatine Kinase   30 - 208 U/L 99    Resulting Agency [facility]          Lipid Panel  Order: [id number]   Status: Final result      Visible to patient: No (inaccessible in MyChart)      Next appt: 04/06/2023 at 01:40 PM in Urology (Austen Rosa MD)      1 Result Note     1 Follow-up Encounter     1  Topic  Component   Ref Range & Units 3 wk ago    Total Cholesterol   100 - 199 mg/dL 145    Triglycerides   0 - 149 mg/dL 249 High     HDL Cholesterol   >39 mg/dL 38 Low     VLDL Cholesterol Hero   5 - 40 mg/dL 40    LDL Chol Calc (NIH)   0 - 99 mg/dL 67    Resulting Agency [facility]        Contains abnormal data Comprehensive Metabolic Panel  Order: [id number]   Status: Final result      Visible to patient: No (inaccessible in MyChart)      Next appt: 04/06/2023 at 01:40 PM in Urology (Austen Rosa MD)      1 Result Note     1 Follow-up Encounter  Component   Ref Range & Units 3 wk ago 5 mo ago 3 yr ago   Glucose   70 - 99 mg/dL 134 High    150 High  R    BUN   8 - 27 mg/dL 19   21 R    Creatinine   0.76 - 1.27 mg/dL 1.03  0.90 R, CM  1.09    EGFR Result   >59 mL/min/1.73 71      BUN/Creatinine Ratio   10 - 24 18   19.3 R    Sodium   134 - 144 mmol/L 139   139 R    Potassium   3.5 - 5.2 mmol/L  5.4 High    4.5    Chloride   96 - 106 mmol/L 99   104 R    Total CO2   20 - 29 mmol/L 25      Calcium   8.6 - 10.2 mg/dL 9.9   9.4 R    Total Protein   6.0 - 8.5 g/dL 7.4      Albumin   3.6 - 4.6 g/dL 5.0 High       Globulin   1.5 - 4.5 g/dL 2.4      A/G Ratio   1.2 - 2.2 2.1      Total Bilirubin   0.0 - 1.2 mg/dL 0.5      Alkaline Phosphatase   44 - 121 IU/L 60      AST (SGOT)   0 - 40 IU/L 31      ALT (SGPT)   0 - 44 IU/L 24      Resulting Agency [facility]  COR LAB  COR LAB                No Images in the past 120 days found..          Assessment & Plan   Diagnoses and all orders for this visit:    1. Hyperlipidemia, unspecified hyperlipidemia type (Primary)  -     Comprehensive Metabolic Panel; Future  -     Lipid Panel; Future  -     CK; Future  Continue on current medications, labs in the near future, daily asa     2. Essential hypertension   Continue to monitor, low sodium dietary intake.       3. Abdominal aortic aneurysm (AAA) without rupture, unspecified part  Stable, continue to follow up with cardiovascular surgeon     4. Leg cramps  Recommend consider a drug holiday for lipitor. Consider changing his Norvasc since he is taking simvastatin. He will discuss further with PCP. Encourage in adequate hydration and will continue to monitor.         Follow up in 6 months, sooner if needed.

## 2023-01-03 ENCOUNTER — TELEPHONE (OUTPATIENT)
Dept: CARDIOLOGY | Facility: CLINIC | Age: 88
End: 2023-01-03
Payer: MEDICARE

## 2023-01-03 DIAGNOSIS — E87.5 HYPERKALEMIA: Primary | ICD-10-CM

## 2023-01-03 NOTE — TELEPHONE ENCOUNTER
----- Message from CED Dangelo sent at 1/2/2023 12:20 PM EST -----  Did he ever has his potassium rechecked?

## 2023-01-03 NOTE — TELEPHONE ENCOUNTER
Called and given message per Charlette COUGHLIN. He stated he has changed his diet and not drinking as much V8 juice and eating lots of sliced tomatoes.  Will get labs in [place],  Mailed him the order per his request.

## 2023-01-11 ENCOUNTER — TELEPHONE (OUTPATIENT)
Dept: CARDIOLOGY | Facility: CLINIC | Age: 88
End: 2023-01-11
Payer: MEDICARE

## 2023-01-11 LAB
AMBIG ABBREV BMP8 DEFAULT: NORMAL
BUN SERPL-MCNC: 31 MG/DL (ref 8–27)
BUN/CREAT SERPL: 31 (ref 10–24)
CALCIUM SERPL-MCNC: 9.9 MG/DL (ref 8.6–10.2)
CHLORIDE SERPL-SCNC: 101 MMOL/L (ref 96–106)
CO2 SERPL-SCNC: 26 MMOL/L (ref 20–29)
CREAT SERPL-MCNC: 1 MG/DL (ref 0.76–1.27)
EGFRCR SERPLBLD CKD-EPI 2021: 73 ML/MIN/1.73
GLUCOSE SERPL-MCNC: 120 MG/DL (ref 70–99)
POTASSIUM SERPL-SCNC: 5.4 MMOL/L (ref 3.5–5.2)
SODIUM SERPL-SCNC: 141 MMOL/L (ref 134–144)

## 2023-01-11 NOTE — TELEPHONE ENCOUNTER
No supplements  He went from drinking 8oz of V* juice qd to 4oz  Qd..  I advised him he may need to stop drinking this everyday.

## 2023-01-11 NOTE — TELEPHONE ENCOUNTER
----- Message from CED Dangelo sent at 1/11/2023 12:26 PM EST -----  Is he taking potassium supplements?

## 2023-01-13 ENCOUNTER — TELEPHONE (OUTPATIENT)
Dept: CARDIOLOGY | Facility: CLINIC | Age: 88
End: 2023-01-13
Payer: MEDICARE

## 2023-01-13 DIAGNOSIS — E87.5 HYPERKALEMIA: Primary | ICD-10-CM

## 2023-01-13 NOTE — TELEPHONE ENCOUNTER
----- Message from CED Dangelo sent at 1/13/2023 12:39 PM EST -----  Recommend he stop all foods high in potassium and consume a low potassium diet, no salt substitute or potassium supplement. We can mail him a copy of low potassium diet. Recommend recheck in [facility] in 2 weeks. If it remains high we have to consider stopping his BP med as this could be contributing and switching him to something different.

## 2023-01-13 NOTE — TELEPHONE ENCOUNTER
Contacted patient regarding results. Patient verbalized understanding and states that he will try to cut more things out of his diet. He states that he will also have labs in two weeks.

## 2023-01-25 ENCOUNTER — TELEPHONE (OUTPATIENT)
Dept: CARDIOLOGY | Facility: CLINIC | Age: 88
End: 2023-01-25

## 2023-01-25 NOTE — TELEPHONE ENCOUNTER
Caller: BRIANNA     Relationship: SELF    Best call back number: [phone]    What is the best time to reach you: ANYTIME    Who are you requesting to speak with (clinical staff, provider,  specific staff member): ANY      What was the call regarding: PATIENT CALLED IN TO HAVE LAB ORDERS FAXED TO [email or web address] IN [place] - PHONE # [phone] - FAX # [phone]. PATIENT IS HAVING LABS DONE ON 1.31.23. PLEASE ADVISE IF THIS WILL BE FAXED TO [email or web address]. THANK YOU    Do you require a callback: IF THERE IS A PROBLEM WITH THIS

## 2023-02-01 LAB
AMBIG ABBREV BMP8 DEFAULT: NORMAL
BUN SERPL-MCNC: 23 MG/DL (ref 8–27)
BUN/CREAT SERPL: 23 (ref 10–24)
CALCIUM SERPL-MCNC: 10.1 MG/DL (ref 8.6–10.2)
CHLORIDE SERPL-SCNC: 98 MMOL/L (ref 96–106)
CO2 SERPL-SCNC: 26 MMOL/L (ref 20–29)
CREAT SERPL-MCNC: 1.01 MG/DL (ref 0.76–1.27)
EGFRCR SERPLBLD CKD-EPI 2021: 72 ML/MIN/1.73
GLUCOSE SERPL-MCNC: 122 MG/DL (ref 70–99)
POTASSIUM SERPL-SCNC: 4.7 MMOL/L (ref 3.5–5.2)
SODIUM SERPL-SCNC: 138 MMOL/L (ref 134–144)

## 2023-02-02 ENCOUNTER — TELEPHONE (OUTPATIENT)
Dept: CARDIOLOGY | Facility: CLINIC | Age: 88
End: 2023-02-02
Payer: MEDICARE

## 2023-02-02 NOTE — TELEPHONE ENCOUNTER
----- Message from CED Dangelo sent at 2/1/2023  3:13 PM EST -----  Potassium has improved, continue on current medications and continue to monitor.

## 2023-04-17 ENCOUNTER — OFFICE VISIT (OUTPATIENT)
Dept: UROLOGY | Facility: CLINIC | Age: 88
End: 2023-04-17
Payer: MEDICARE

## 2023-04-17 VITALS
WEIGHT: 172 LBS | SYSTOLIC BLOOD PRESSURE: 143 MMHG | HEART RATE: 69 BPM | DIASTOLIC BLOOD PRESSURE: 81 MMHG | HEIGHT: 67 IN | BODY MASS INDEX: 27 KG/M2

## 2023-04-17 DIAGNOSIS — C61 PROSTATE CANCER: Primary | ICD-10-CM

## 2023-04-17 PROCEDURE — 84153 ASSAY OF PSA TOTAL: CPT | Performed by: UROLOGY

## 2023-04-17 PROCEDURE — 1159F MED LIST DOCD IN RCRD: CPT | Performed by: UROLOGY

## 2023-04-17 PROCEDURE — 36415 COLL VENOUS BLD VENIPUNCTURE: CPT | Performed by: UROLOGY

## 2023-04-17 PROCEDURE — 1160F RVW MEDS BY RX/DR IN RCRD: CPT | Performed by: UROLOGY

## 2023-04-17 PROCEDURE — 99213 OFFICE O/P EST LOW 20 MIN: CPT | Performed by: UROLOGY

## 2023-04-17 RX ORDER — TADALAFIL 5 MG/1
5 TABLET ORAL DAILY PRN
Qty: 30 TABLET | Refills: 6 | Status: SHIPPED | OUTPATIENT
Start: 2023-04-17

## 2023-04-17 NOTE — PROGRESS NOTES
Chief Complaint:    Prostate Cancer    HPI:   87 y.o. male returns today had radiation in [place], he reports no lower urinary tract symptomatology, particularly irritative symptoms such as frequency, urgency, dysuria, and obstructive symptomatology, particularly dribbling, hesitancy, and intermittency.  No weight loss no constitutional symptomatology I gave him reassurance.  Past Medical History:     Past Medical History:   Diagnosis Date   • AAA (abdominal aortic aneurysm)    • Arthritis    • Cancer    • Coronary artery disease    • Elevated cholesterol    • Glaucoma    • Hypertension    • Prostate cancer    • Skin cancer        Current Meds:     Current Outpatient Medications   Medication Sig Dispense Refill   • acetaminophen (TYLENOL) 650 MG 8 hr tablet Take 650 mg by mouth Every 8 (Eight) Hours As Needed for mild pain (1-3).     • amLODIPine (NORVASC) 2.5 MG tablet 2.5 mg Every Evening.     • Farxiga 5 MG tablet tablet Take 5 mg by mouth Daily.     • fenofibrate (TRICOR) 145 MG tablet Take 145 mg by mouth Daily.     • fluticasone (FLONASE) 50 MCG/ACT nasal spray As Needed.     • multivitamin (ONE-A-DAY 55 PLUS PO) Take 1 tablet by mouth Daily.     • Rocklatan 0.02-0.005 % solution Daily.     • simvastatin (ZOCOR) 20 MG tablet Take 20 mg by mouth Every Night.     • valsartan (DIOVAN) 320 MG tablet Take 320 mg by mouth.       No current facility-administered medications for this visit.        Allergies:      Allergies   Allergen Reactions   • Percocet [Oxycodone-Acetaminophen] Nausea Only   • Codeine Other (See Comments)     headaches   • Morphine And Related Hallucinations        Past Surgical History:     Past Surgical History:   Procedure Laterality Date   • ABDOMINAL AORTIC ANEURYSM REPAIR     • ABDOMINAL AORTIC ANEURYSM REPAIR     • ABDOMINAL AORTIC ANEURYSM REPAIR     • BACK SURGERY      cervical   • COLONOSCOPY  05/2019   • CYBERKNIFE  01/17/2020    Prostate   • ENDOSCOPY     • EYE SURGERY Bilateral      iol-Cataract and Scar Tissue removed right eye   • GALLBLADDER SURGERY     • PROSTATE FIDUCIAL MARKER PLACEMENT N/A 2019    Procedure: PROSTATE FIDUCIAL MARKER PLACEMENT WITH 6 MARKERS;  Surgeon: Tom Pineda MD;  Location: [facility];  Service: Urology       Social History:     Social History     Socioeconomic History   • Marital status:    • Number of children: 3   Tobacco Use   • Smoking status: Former     Packs/day: 0.50     Years: 15.00     Pack years: 7.50     Types: Cigarettes     Quit date: 6/15/1981     Years since quittin.8   • Smokeless tobacco: Former     Types: Chew     Quit date: 6/15/1961   Vaping Use   • Vaping Use: Never used   Substance and Sexual Activity   • Alcohol use: No   • Drug use: No   • Sexual activity: Defer       Family History:     Family History   Problem Relation Age of Onset   • No Known Problems Father    • No Known Problems Mother        Review of Systems:     Review of Systems   Constitutional: Negative.    HENT: Negative.    Eyes: Negative.    Respiratory: Negative.    Cardiovascular: Negative.    Gastrointestinal: Negative.    Endocrine: Negative.    Musculoskeletal: Negative.    Allergic/Immunologic: Negative.    Neurological: Negative.    Hematological: Negative.    Psychiatric/Behavioral: Negative.        Physical Exam:     Physical Exam  Vitals and nursing note reviewed.   Constitutional:       Appearance: He is well-developed.   HENT:      Head: Normocephalic and atraumatic.   Eyes:      Conjunctiva/sclera: Conjunctivae normal.      Pupils: Pupils are equal, round, and reactive to light.   Cardiovascular:      Rate and Rhythm: Normal rate and regular rhythm.      Heart sounds: Normal heart sounds.   Pulmonary:      Effort: Pulmonary effort is normal.      Breath sounds: Normal breath sounds.   Abdominal:      General: Bowel sounds are normal.      Palpations: Abdomen is soft.   Musculoskeletal:         General: Normal range of motion.       Cervical back: Normal range of motion.   Skin:     General: Skin is warm and dry.   Neurological:      Mental Status: He is alert and oriented to person, place, and time.      Deep Tendon Reflexes: Reflexes are normal and symmetric.   Psychiatric:         Behavior: Behavior normal.         Thought Content: Thought content normal.         Judgment: Judgment normal.         I have reviewed the following portions of the patient's history: Allergies, current medications, past family history, past medical history, past social history, past surgical history, problem list, and ROS and confirm it is accurate.    Recent Image (CT and/or KUB):      CT Abdomen and Pelvis: No results found for this or any previous visit.       CT Stone Protocol: No results found for this or any previous visit.       KUB: No results found for this or any previous visit.       Labs (past 3 months):      Orders Only on 01/31/2023   Component Date Value Ref Range Status   • Glucose 01/31/2023 122 (H)  70 - 99 mg/dL Final   • BUN 01/31/2023 23  8 - 27 mg/dL Final   • Creatinine 01/31/2023 1.01  0.76 - 1.27 mg/dL Final   • EGFR Result 01/31/2023 72  >59 mL/min/1.73 Final   • BUN/Creatinine Ratio 01/31/2023 23  10 - 24 Final   • Sodium 01/31/2023 138  134 - 144 mmol/L Final   • Potassium 01/31/2023 4.7  3.5 - 5.2 mmol/L Final   • Chloride 01/31/2023 98  96 - 106 mmol/L Final   • Total CO2 01/31/2023 26  20 - 29 mmol/L Final   • Calcium 01/31/2023 10.1  8.6 - 10.2 mg/dL Final   • BMP 01/31/2023 Comment   Final    Comment: A hand-written panel/profile was received from your office. In  accordance with the [facility] Ambiguous Test Code Policy dated July 2003, we have completed your order by using the closest currently  or formerly recognized AMA panel.  We have assigned Basic Metabolic  Panel (8), Test Code #473608 to this request. If this is not the  testing you wished to receive on this specimen, please contact the  [facility] Client Inquiry/Technical  Services Department to clarify the  test order.  We appreciate your business.          Procedure:       Assessment/Plan:   Prostate cancer:  He returns today status post biopsy for extensive discussion of prostate cancer.  We discussed staging and grading of the disease.  I described with a [place] system being from a 2 to10 scale with the most common low-grade pattern being a Tamie 6.  I discussed the staging workup including a total body bone scan and CT scan especially with a PSA greater than 10.  We discussed the various options at length. I discussed a radical retropubic prostatectomy done in the traditional fashion and using the robotic technique.  I then discussed radiation treatment both seed therapy and external beam therapy using Gold fiduciary markers.  We talked about some of the other alternatives such as a cryosurgical ablation at high intensity focused ultrasound as being viable alternatives but not recommended at this time.  He focused on aggressive watchful waiting and explained that currently low literature it's been discovered that a lot of men can actually observe it especially with numerous other comorbidities cannot have problems from the cancer by itself.  Talked about hormonal ablation and the side effects of creation of insulin resistance.  Overall, the patient was given appropriate literature, is going to think about it, and I'm going to revisit the topic with them after the next office visit.  Doing great PSA pending              This document has been electronically signed by BONG ORTIZ MD April 17, 2023 15:23 EDT    Dictated Utilizing Dragon Dictation: Part of this note may be an electronic transcription/translation of spoken language to printed text using the Dragon Dictation System.

## 2023-04-18 LAB — PSA SERPL-MCNC: 0.03 NG/ML (ref 0–4)

## 2023-05-23 DIAGNOSIS — I71.40 ABDOMINAL AORTIC ANEURYSM (AAA) WITHOUT RUPTURE, UNSPECIFIED PART: Primary | ICD-10-CM

## 2023-06-01 ENCOUNTER — OFFICE VISIT (OUTPATIENT)
Dept: CARDIOLOGY | Facility: CLINIC | Age: 88
End: 2023-06-01
Payer: MEDICARE

## 2023-06-01 VITALS
SYSTOLIC BLOOD PRESSURE: 124 MMHG | DIASTOLIC BLOOD PRESSURE: 71 MMHG | BODY MASS INDEX: 26.12 KG/M2 | WEIGHT: 166.4 LBS | HEART RATE: 71 BPM | HEIGHT: 67 IN | OXYGEN SATURATION: 97 %

## 2023-06-01 DIAGNOSIS — Z86.39 HISTORY OF HYPERKALEMIA: ICD-10-CM

## 2023-06-01 DIAGNOSIS — I10 HYPERTENSION, UNSPECIFIED TYPE: Primary | ICD-10-CM

## 2023-06-01 DIAGNOSIS — I71.40 ABDOMINAL AORTIC ANEURYSM (AAA) WITHOUT RUPTURE, UNSPECIFIED PART: ICD-10-CM

## 2023-06-01 DIAGNOSIS — E78.5 HYPERLIPIDEMIA, UNSPECIFIED HYPERLIPIDEMIA TYPE: ICD-10-CM

## 2023-06-01 PROCEDURE — 1159F MED LIST DOCD IN RCRD: CPT | Performed by: NURSE PRACTITIONER

## 2023-06-01 PROCEDURE — 99213 OFFICE O/P EST LOW 20 MIN: CPT | Performed by: NURSE PRACTITIONER

## 2023-06-01 PROCEDURE — 1160F RVW MEDS BY RX/DR IN RCRD: CPT | Performed by: NURSE PRACTITIONER

## 2023-06-01 NOTE — LETTER
June 6, 2023     Emilie Rausch MD  [address] [facility] [address]  [facility] [address]    Patient: Celso Mcelroy   YOB: 1935   Date of Visit: 6/1/2023       Dear Emilie Rausch MD    Celso Mcelroy was in my office today. Below is a copy of my note.    If you have questions, please do not hesitate to call me. I look forward to following Celso along with you.         Sincerely,        CED Fonseca        CC: No Recipients    Subjective    Celso Mcelroy is a 87 y.o. male.   Chief Complaint   Patient presents with   • Hyperlipidemia     6 month follow up      History of Present Illness   Celso Mcelroy is an 87-year-old male who presents to clinic today for cardiology follow-up.  He is accompanied by his son.  Denies any acute complaint and feels he is overall doing well.    Hypertension currently on Valsartan 320 mg daily and Norvasc 2.5 mg daily. Intermittent monitoring with normal readings. Denies hypotension. Reports compliance with medications. Denies chest pain, dyspnea, palpitations, dizziness or syncope.  He had some labs with slightly elevated potassium with repeats showing a normal potassium.  He is adherent to a low potassium diet.  He exercises on a regular basis and denies any acute complaint.     Hyperlipidemia on Zocor 20 mg nightly and TriCor 145 mg daily.  He denies myalgias. His most recent LDL was 67.     S/p abdominal aortic aneurysm following with Dr. Celeste.  He denies abdominal or back pain. History of an abdominal aortic aneurysm repair at the [facility] in 2001.       Patient Active Problem List   Diagnosis   • Nocturia   • Prostate cancer   • Erectile dysfunction   • AAA (abdominal aortic aneurysm) without rupture s/p open repair 2001     Past Medical History:   Diagnosis Date   • AAA (abdominal aortic aneurysm)    • Arthritis    • Cancer    • Coronary artery disease    • Elevated cholesterol    • Glaucoma    • Hypertension    • Prostate cancer    •  Skin cancer      Past Surgical History:   Procedure Laterality Date   • ABDOMINAL AORTIC ANEURYSM REPAIR     • ABDOMINAL AORTIC ANEURYSM REPAIR     • ABDOMINAL AORTIC ANEURYSM REPAIR     • BACK SURGERY      cervical   • COLONOSCOPY  2019   • CYBERKNIFE  2020    Prostate   • ENDOSCOPY     • EYE SURGERY Bilateral     iol-Cataract and Scar Tissue removed right eye   • GALLBLADDER SURGERY     • PROSTATE FIDUCIAL MARKER PLACEMENT N/A 2019    Procedure: PROSTATE FIDUCIAL MARKER PLACEMENT WITH 6 MARKERS;  Surgeon: Tom Pineda MD;  Location: [facility];  Service: Urology     Family History   Problem Relation Age of Onset   • No Known Problems Father    • No Known Problems Mother      Social History     Tobacco Use   • Smoking status: Former     Packs/day: 0.50     Years: 15.00     Pack years: 7.50     Types: Cigarettes     Quit date: 6/15/1981     Years since quittin.0   • Smokeless tobacco: Former     Types: Chew     Quit date: 6/15/1961   Vaping Use   • Vaping Use: Never used   Substance Use Topics   • Alcohol use: No   • Drug use: No     The following portions of the patient's history were reviewed and updated as appropriate: allergies, current medications, past family history, past medical history, past social history, past surgical history and problem list.    Allergies   Allergen Reactions   • Percocet [Oxycodone-Acetaminophen] Nausea Only   • Codeine Other (See Comments)     headaches   • Morphine And Related Hallucinations         Current Outpatient Medications:   •  acetaminophen (TYLENOL) 650 MG 8 hr tablet, Take 1 tablet by mouth Every 8 (Eight) Hours As Needed for Mild Pain., Disp: , Rfl:   •  amLODIPine (NORVASC) 2.5 MG tablet, 1 tablet Every Evening., Disp: , Rfl:   •  Farxiga 5 MG tablet tablet, Take 1 tablet by mouth Daily., Disp: , Rfl:   •  fenofibrate (TRICOR) 145 MG tablet, Take 1 tablet by mouth Daily., Disp: , Rfl:   •  fluticasone (FLONASE) 50 MCG/ACT nasal spray, As  "Needed., Disp: , Rfl:   •  multivitamin (THERAGRAN) tablet tablet, Take 1 tablet by mouth Daily., Disp: , Rfl:   •  Rocklatan 0.02-0.005 % solution, Daily., Disp: , Rfl:   •  simvastatin (ZOCOR) 20 MG tablet, Take 1 tablet by mouth Every Night., Disp: , Rfl:   •  tadalafil (Cialis) 5 MG tablet, Take 1 tablet by mouth Daily As Needed for Erectile Dysfunction. Take one Daily, Disp: 30 tablet, Rfl: 6  •  valsartan (DIOVAN) 320 MG tablet, Take 1 tablet by mouth., Disp: , Rfl:     Review of Systems   Constitutional:  Negative for activity change, appetite change, chills, diaphoresis, fatigue and fever.   HENT:  Negative for congestion, drooling, ear discharge, ear pain, mouth sores, nosebleeds, postnasal drip, rhinorrhea, sinus pressure, sneezing and sore throat.    Eyes:  Negative for pain, discharge and visual disturbance.   Respiratory:  Negative for cough, chest tightness, shortness of breath and wheezing.    Cardiovascular:  Negative for chest pain, palpitations and leg swelling.   Gastrointestinal:  Negative for abdominal pain, constipation, diarrhea, nausea and vomiting.   Endocrine: Negative for cold intolerance, heat intolerance, polydipsia, polyphagia and polyuria.   Musculoskeletal:  Negative for arthralgias, myalgias and neck pain.   Skin:  Negative for rash and wound.   Neurological:  Negative for dizziness, syncope, speech difficulty, weakness, light-headedness and headaches.   Hematological:  Negative for adenopathy. Does not bruise/bleed easily.   Psychiatric/Behavioral:  Negative for confusion, dysphoric mood and sleep disturbance. The patient is not nervous/anxious.    All other systems reviewed and are negative.    /71 (BP Location: Left arm, Patient Position: Sitting, Cuff Size: Adult)   Pulse 71   Ht 170.2 cm (67\")   Wt 75.5 kg (166 lb 6.4 oz)   SpO2 97%   BMI 26.06 kg/m²     Objective  Allergies   Allergen Reactions   • Percocet [Oxycodone-Acetaminophen] Nausea Only   • Codeine Other (See " Comments)     headaches   • Morphine And Related Hallucinations       Physical Exam  Vitals reviewed.   Constitutional:       Appearance: Normal appearance. He is well-developed.      Comments: Uses a cane    HENT:      Head: Normocephalic.   Eyes:      Conjunctiva/sclera: Conjunctivae normal.   Neck:      Thyroid: No thyromegaly.      Vascular: No carotid bruit or JVD.   Cardiovascular:      Rate and Rhythm: Normal rate and regular rhythm.   Pulmonary:      Effort: Pulmonary effort is normal.      Breath sounds: Normal breath sounds.   Musculoskeletal:      Cervical back: Neck supple.      Right lower leg: No edema.      Left lower leg: No edema.   Skin:     General: Skin is warm and dry.   Neurological:      Mental Status: He is alert and oriented to person, place, and time.   Psychiatric:         Attention and Perception: Attention normal.         Mood and Affect: Mood normal.         Speech: Speech normal.         Behavior: Behavior normal. Behavior is cooperative.         Cognition and Memory: Cognition normal.         LABS  WBC   Date Value Ref Range Status   12/09/2019 6.22 3.40 - 10.80 10*3/mm3 Final     RBC   Date Value Ref Range Status   12/09/2019 4.49 4.14 - 5.80 10*6/mm3 Final     Hemoglobin   Date Value Ref Range Status   12/09/2019 14.3 13.0 - 17.7 g/dL Final     Hematocrit   Date Value Ref Range Status   12/09/2019 42.5 37.5 - 51.0 % Final     MCV   Date Value Ref Range Status   12/09/2019 94.7 79.0 - 97.0 fL Final     MCH   Date Value Ref Range Status   12/09/2019 31.8 26.6 - 33.0 pg Final     MCHC   Date Value Ref Range Status   12/09/2019 33.6 31.5 - 35.7 g/dL Final     RDW   Date Value Ref Range Status   12/09/2019 12.2 (L) 12.3 - 15.4 % Final     RDW-SD   Date Value Ref Range Status   12/09/2019 42.6 37.0 - 54.0 fl Final     MPV   Date Value Ref Range Status   12/09/2019 10.5 6.0 - 12.0 fL Final     Platelets   Date Value Ref Range Status   12/09/2019 253 140 - 450 10*3/mm3 Final        Triglycerides   Date Value Ref Range Status   11/16/2022 249 (H) 0 - 149 mg/dL Final     HDL Cholesterol   Date Value Ref Range Status   11/16/2022 38 (L) >39 mg/dL Final     LDL Chol Calc (NIH)   Date Value Ref Range Status   11/16/2022 67 0 - 99 mg/dL Final     IMAGING  CT Head Without Contrast    Result Date: 2/17/2023  No acute intracranial abnormality. CT CERVICAL SPINE HISTORY: Pain after a fall FINDINGS: Axial CT images of the cervical spine were obtained. Sagittal and coronal reformatted images were also obtained. This study was performed with techniques to keep radiation doses as low as reasonably achievable, (ALARA). Individualized dose reduction techniques using automated exposure control or adjustment of mA and/or kV according to the patient size were employed. There is no evidence of fracture or dislocation.  The vertebral alignment is normal. There are moderate degenerative changes. Changes are seen from fusion at C6-7..  There is no evidence of significant canal stenosis.  No paraspinous soft tissue abnormality is seen. Limited images of the upper thorax are unremarkable. IMPRESSION:  No evidence of fracture or acute bony abnormality. Moderate degenerative change. Images reviewed, interpreted, and dictated by Rosalino Reeves MD    XR hip 2 views right    Result Date: 2/18/2023  No acute bony abnormality identified. If the patient's symptoms are persistent or severe, consider CT or MRI for further evaluation. TWO-VIEW LEFT HUMERUS HISTORY: Fall today. FINDINGS:  Two views show no evidence of acute fracture or dislocation. Mild degenerative changes are noted. Chronic calcifications are seen at the medial and lateral elbow. There is lateral upper arm soft tissue swelling. No foreign body is identified. IMPRESSION: No acute bony abnormality identified. Lateral upper arm soft tissue swelling. Images reviewed, interpreted, and dictated by Rosalino Reeves MD    XR humerus 2 views left    Result Date:  2/18/2023  No acute bony abnormality identified. If the patient's symptoms are persistent or severe, consider CT or MRI for further evaluation. TWO-VIEW LEFT HUMERUS HISTORY: Fall today. FINDINGS:  Two views show no evidence of acute fracture or dislocation. Mild degenerative changes are noted. Chronic calcifications are seen at the medial and lateral elbow. There is lateral upper arm soft tissue swelling. No foreign body is identified. IMPRESSION: No acute bony abnormality identified. Lateral upper arm soft tissue swelling. Images reviewed, interpreted, and dictated by Rosalino Reeves MD    CT cervical spine without contrast    Result Date: 2/17/2023  No acute intracranial abnormality. CT CERVICAL SPINE HISTORY: Pain after a fall FINDINGS: Axial CT images of the cervical spine were obtained. Sagittal and coronal reformatted images were also obtained. This study was performed with techniques to keep radiation doses as low as reasonably achievable, (ALARA). Individualized dose reduction techniques using automated exposure control or adjustment of mA and/or kV according to the patient size were employed. There is no evidence of fracture or dislocation.  The vertebral alignment is normal. There are moderate degenerative changes. Changes are seen from fusion at C6-7..  There is no evidence of significant canal stenosis.  No paraspinous soft tissue abnormality is seen. Limited images of the upper thorax are unremarkable. IMPRESSION:  No evidence of fracture or acute bony abnormality. Moderate degenerative change. Images reviewed, interpreted, and dictated by Rosalino Reeves MD            Assessment & Plan  Diagnoses and all orders for this visit:    1. Hypertension, unspecified type (Primary)  controlled, continue current therapy, routine monitoring recommended, sodium restrictions encouraged    2. Hyperlipidemia, unspecified hyperlipidemia type  Continue on statin, heart healthy diet    3. History of  hyperkalemia  Stable with recent labs, continue to monitor, adherence to low potassium diet recommended    4. Abdominal aortic aneurysm (AAA) without rupture, unspecified part  Continue annual follow-up with Dr. Celeste.      Lifestyle modifications including heart healthy diet, regular exercise, maintenance of desirable body weight and avoidance of tobacco products.

## 2023-06-01 NOTE — PROGRESS NOTES
Subjective     Celso Mcelroy is a 87 y.o. male.   Chief Complaint   Patient presents with    Hyperlipidemia     6 month follow up      History of Present Illness   Celso Mcelroy is an 87-year-old male who presents to clinic today for cardiology follow-up.  He is accompanied by his son.  Denies any acute complaint and feels he is overall doing well.    Hypertension currently on Valsartan 320 mg daily and Norvasc 2.5 mg daily. Intermittent monitoring with normal readings. Denies hypotension. Reports compliance with medications. Denies chest pain, dyspnea, palpitations, dizziness or syncope.  He had some labs with slightly elevated potassium with repeats showing a normal potassium.  He is adherent to a low potassium diet.  He exercises on a regular basis and denies any acute complaint.     Hyperlipidemia on Zocor 20 mg nightly and TriCor 145 mg daily.  He denies myalgias. His most recent LDL was 67.     S/p abdominal aortic aneurysm following with Dr. Celeste.  He denies abdominal or back pain. History of an abdominal aortic aneurysm repair at the [facility] in 2001.       Patient Active Problem List   Diagnosis    Nocturia    Prostate cancer    Erectile dysfunction    AAA (abdominal aortic aneurysm) without rupture s/p open repair 2001     Past Medical History:   Diagnosis Date    AAA (abdominal aortic aneurysm)     Arthritis     Cancer     Coronary artery disease     Elevated cholesterol     Glaucoma     Hypertension     Prostate cancer     Skin cancer      Past Surgical History:   Procedure Laterality Date    ABDOMINAL AORTIC ANEURYSM REPAIR      ABDOMINAL AORTIC ANEURYSM REPAIR      ABDOMINAL AORTIC ANEURYSM REPAIR      BACK SURGERY      cervical    COLONOSCOPY  05/2019    CYBERKNIFE  01/17/2020    Prostate    ENDOSCOPY      EYE SURGERY Bilateral     iol-Cataract and Scar Tissue removed right eye    GALLBLADDER SURGERY      PROSTATE FIDUCIAL MARKER PLACEMENT N/A 12/16/2019    Procedure: PROSTATE  FIDUCIAL MARKER PLACEMENT WITH 6 MARKERS;  Surgeon: Tom Pineda MD;  Location: [facility];  Service: Urology     Family History   Problem Relation Age of Onset    No Known Problems Father     No Known Problems Mother      Social History     Tobacco Use    Smoking status: Former     Packs/day: 0.50     Years: 15.00     Pack years: 7.50     Types: Cigarettes     Quit date: 6/15/1981     Years since quittin.0    Smokeless tobacco: Former     Types: Chew     Quit date: 6/15/1961   Vaping Use    Vaping Use: Never used   Substance Use Topics    Alcohol use: No    Drug use: No     The following portions of the patient's history were reviewed and updated as appropriate: allergies, current medications, past family history, past medical history, past social history, past surgical history and problem list.    Allergies   Allergen Reactions    Percocet [Oxycodone-Acetaminophen] Nausea Only    Codeine Other (See Comments)     headaches    Morphine And Related Hallucinations         Current Outpatient Medications:     acetaminophen (TYLENOL) 650 MG 8 hr tablet, Take 1 tablet by mouth Every 8 (Eight) Hours As Needed for Mild Pain., Disp: , Rfl:     amLODIPine (NORVASC) 2.5 MG tablet, 1 tablet Every Evening., Disp: , Rfl:     Farxiga 5 MG tablet tablet, Take 1 tablet by mouth Daily., Disp: , Rfl:     fenofibrate (TRICOR) 145 MG tablet, Take 1 tablet by mouth Daily., Disp: , Rfl:     fluticasone (FLONASE) 50 MCG/ACT nasal spray, As Needed., Disp: , Rfl:     multivitamin (THERAGRAN) tablet tablet, Take 1 tablet by mouth Daily., Disp: , Rfl:     Rocklatan 0.02-0.005 % solution, Daily., Disp: , Rfl:     simvastatin (ZOCOR) 20 MG tablet, Take 1 tablet by mouth Every Night., Disp: , Rfl:     tadalafil (Cialis) 5 MG tablet, Take 1 tablet by mouth Daily As Needed for Erectile Dysfunction. Take one Daily, Disp: 30 tablet, Rfl: 6    valsartan (DIOVAN) 320 MG tablet, Take 1 tablet by mouth., Disp: , Rfl:     Review of Systems  "  Constitutional:  Negative for activity change, appetite change, chills, diaphoresis, fatigue and fever.   HENT:  Negative for congestion, drooling, ear discharge, ear pain, mouth sores, nosebleeds, postnasal drip, rhinorrhea, sinus pressure, sneezing and sore throat.    Eyes:  Negative for pain, discharge and visual disturbance.   Respiratory:  Negative for cough, chest tightness, shortness of breath and wheezing.    Cardiovascular:  Negative for chest pain, palpitations and leg swelling.   Gastrointestinal:  Negative for abdominal pain, constipation, diarrhea, nausea and vomiting.   Endocrine: Negative for cold intolerance, heat intolerance, polydipsia, polyphagia and polyuria.   Musculoskeletal:  Negative for arthralgias, myalgias and neck pain.   Skin:  Negative for rash and wound.   Neurological:  Negative for dizziness, syncope, speech difficulty, weakness, light-headedness and headaches.   Hematological:  Negative for adenopathy. Does not bruise/bleed easily.   Psychiatric/Behavioral:  Negative for confusion, dysphoric mood and sleep disturbance. The patient is not nervous/anxious.    All other systems reviewed and are negative.    /71 (BP Location: Left arm, Patient Position: Sitting, Cuff Size: Adult)   Pulse 71   Ht 170.2 cm (67\")   Wt 75.5 kg (166 lb 6.4 oz)   SpO2 97%   BMI 26.06 kg/m²     Objective   Allergies   Allergen Reactions    Percocet [Oxycodone-Acetaminophen] Nausea Only    Codeine Other (See Comments)     headaches    Morphine And Related Hallucinations       Physical Exam  Vitals reviewed.   Constitutional:       Appearance: Normal appearance. He is well-developed.      Comments: Uses a cane    HENT:      Head: Normocephalic.   Eyes:      Conjunctiva/sclera: Conjunctivae normal.   Neck:      Thyroid: No thyromegaly.      Vascular: No carotid bruit or JVD.   Cardiovascular:      Rate and Rhythm: Normal rate and regular rhythm.   Pulmonary:      Effort: Pulmonary effort is normal. "      Breath sounds: Normal breath sounds.   Musculoskeletal:      Cervical back: Neck supple.      Right lower leg: No edema.      Left lower leg: No edema.   Skin:     General: Skin is warm and dry.   Neurological:      Mental Status: He is alert and oriented to person, place, and time.   Psychiatric:         Attention and Perception: Attention normal.         Mood and Affect: Mood normal.         Speech: Speech normal.         Behavior: Behavior normal. Behavior is cooperative.         Cognition and Memory: Cognition normal.         LABS  WBC   Date Value Ref Range Status   12/09/2019 6.22 3.40 - 10.80 10*3/mm3 Final     RBC   Date Value Ref Range Status   12/09/2019 4.49 4.14 - 5.80 10*6/mm3 Final     Hemoglobin   Date Value Ref Range Status   12/09/2019 14.3 13.0 - 17.7 g/dL Final     Hematocrit   Date Value Ref Range Status   12/09/2019 42.5 37.5 - 51.0 % Final     MCV   Date Value Ref Range Status   12/09/2019 94.7 79.0 - 97.0 fL Final     MCH   Date Value Ref Range Status   12/09/2019 31.8 26.6 - 33.0 pg Final     MCHC   Date Value Ref Range Status   12/09/2019 33.6 31.5 - 35.7 g/dL Final     RDW   Date Value Ref Range Status   12/09/2019 12.2 (L) 12.3 - 15.4 % Final     RDW-SD   Date Value Ref Range Status   12/09/2019 42.6 37.0 - 54.0 fl Final     MPV   Date Value Ref Range Status   12/09/2019 10.5 6.0 - 12.0 fL Final     Platelets   Date Value Ref Range Status   12/09/2019 253 140 - 450 10*3/mm3 Final       Triglycerides   Date Value Ref Range Status   11/16/2022 249 (H) 0 - 149 mg/dL Final     HDL Cholesterol   Date Value Ref Range Status   11/16/2022 38 (L) >39 mg/dL Final     LDL Chol Calc (NIH)   Date Value Ref Range Status   11/16/2022 67 0 - 99 mg/dL Final     IMAGING  CT Head Without Contrast    Result Date: 2/17/2023  No acute intracranial abnormality. CT CERVICAL SPINE HISTORY: Pain after a fall FINDINGS: Axial CT images of the cervical spine were obtained. Sagittal and coronal reformatted images  were also obtained. This study was performed with techniques to keep radiation doses as low as reasonably achievable, (ALARA). Individualized dose reduction techniques using automated exposure control or adjustment of mA and/or kV according to the patient size were employed. There is no evidence of fracture or dislocation.  The vertebral alignment is normal. There are moderate degenerative changes. Changes are seen from fusion at C6-7..  There is no evidence of significant canal stenosis.  No paraspinous soft tissue abnormality is seen. Limited images of the upper thorax are unremarkable. IMPRESSION:  No evidence of fracture or acute bony abnormality. Moderate degenerative change. Images reviewed, interpreted, and dictated by Rosalino Reeves MD    XR hip 2 views right    Result Date: 2/18/2023  No acute bony abnormality identified. If the patient's symptoms are persistent or severe, consider CT or MRI for further evaluation. TWO-VIEW LEFT HUMERUS HISTORY: Fall today. FINDINGS:  Two views show no evidence of acute fracture or dislocation. Mild degenerative changes are noted. Chronic calcifications are seen at the medial and lateral elbow. There is lateral upper arm soft tissue swelling. No foreign body is identified. IMPRESSION: No acute bony abnormality identified. Lateral upper arm soft tissue swelling. Images reviewed, interpreted, and dictated by Rosalino Reeves MD    XR humerus 2 views left    Result Date: 2/18/2023  No acute bony abnormality identified. If the patient's symptoms are persistent or severe, consider CT or MRI for further evaluation. TWO-VIEW LEFT HUMERUS HISTORY: Fall today. FINDINGS:  Two views show no evidence of acute fracture or dislocation. Mild degenerative changes are noted. Chronic calcifications are seen at the medial and lateral elbow. There is lateral upper arm soft tissue swelling. No foreign body is identified. IMPRESSION: No acute bony abnormality identified. Lateral upper arm soft  tissue swelling. Images reviewed, interpreted, and dictated by Rosalino Reeves MD    CT cervical spine without contrast    Result Date: 2/17/2023  No acute intracranial abnormality. CT CERVICAL SPINE HISTORY: Pain after a fall FINDINGS: Axial CT images of the cervical spine were obtained. Sagittal and coronal reformatted images were also obtained. This study was performed with techniques to keep radiation doses as low as reasonably achievable, (ALARA). Individualized dose reduction techniques using automated exposure control or adjustment of mA and/or kV according to the patient size were employed. There is no evidence of fracture or dislocation.  The vertebral alignment is normal. There are moderate degenerative changes. Changes are seen from fusion at C6-7..  There is no evidence of significant canal stenosis.  No paraspinous soft tissue abnormality is seen. Limited images of the upper thorax are unremarkable. IMPRESSION:  No evidence of fracture or acute bony abnormality. Moderate degenerative change. Images reviewed, interpreted, and dictated by Rosalino Reeves MD            Assessment & Plan   Diagnoses and all orders for this visit:    1. Hypertension, unspecified type (Primary)  controlled, continue current therapy, routine monitoring recommended, sodium restrictions encouraged    2. Hyperlipidemia, unspecified hyperlipidemia type  Continue on statin, heart healthy diet    3. History of hyperkalemia  Stable with recent labs, continue to monitor, adherence to low potassium diet recommended    4. Abdominal aortic aneurysm (AAA) without rupture, unspecified part  Continue annual follow-up with Dr. Celeste.      Lifestyle modifications including heart healthy diet, regular exercise, maintenance of desirable body weight and avoidance of tobacco products.

## 2023-08-09 ENCOUNTER — OFFICE VISIT (OUTPATIENT)
Dept: CARDIAC SURGERY | Facility: CLINIC | Age: 88
End: 2023-08-09
Payer: MEDICARE

## 2023-08-09 VITALS
HEART RATE: 75 BPM | WEIGHT: 160 LBS | OXYGEN SATURATION: 96 % | BODY MASS INDEX: 25.06 KG/M2 | SYSTOLIC BLOOD PRESSURE: 110 MMHG | DIASTOLIC BLOOD PRESSURE: 56 MMHG | TEMPERATURE: 97.5 F

## 2023-08-09 DIAGNOSIS — I71.43 INFRARENAL ABDOMINAL AORTIC ANEURYSM (AAA) WITHOUT RUPTURE: Primary | ICD-10-CM

## 2023-08-09 RX ORDER — SUCRALFATE 1 G/1
1 TABLET ORAL 4 TIMES DAILY
Qty: 120 TABLET | Refills: 12 | COMMUNITY
Start: 2023-07-19 | End: 2024-07-25

## 2023-08-09 RX ORDER — PANTOPRAZOLE SODIUM 40 MG/1
40 TABLET, DELAYED RELEASE ORAL
COMMUNITY
Start: 2023-07-19

## 2023-08-09 RX ORDER — CETIRIZINE HYDROCHLORIDE 10 MG/1
10 TABLET ORAL DAILY
COMMUNITY

## 2023-08-09 NOTE — PROGRESS NOTES
[facility] Cardiothoracic Surgery Office Follow Up Note    Date of Encounter: 2023     Name: Celso Mcelroy  : 1935     Referred By: No ref. provider found  PCP: Emilie Rausch MD    Chief Complaint:    Chief Complaint   Patient presents with    Aortic Aneurysm     1 year follow-up with results from u/s aorta. History of open AAA repair        Subjective      History of Present Illness:    It was nice to see Celso Mcelroy in follow up.  He is a pleasant 87 y.o. male with PMH significant for former tobacco dependence, prostate cancer, hypertension, hyperlipidemia on statin therapy, and abdominal aortic aneurysm s/p open repair at [facility] in .  Patient initially referred to Dr. Celeste 2021 for surveillance.  Radiology report from CT imaging at that time interpreted a 3.4  cm infrarenal abdominal aortic aneurysm.  On Dr. Celeste's personal review this appeared to be a conduit overlying his native aortic bifurcation, however the study was limited without contrast.    Mr. Mcelroy was last seen in office on 2022 by CED Trotter at which time patient was doing well.  CTA abdomen pelvis revealed a 3.2 cm native sac.  Mr. Mcelroy presents to office today for annual surveillance with imaging.  He is accompanied by his son.  Patient reports overall doing well.  He was recently hospitalized for an ulcer.  Patient reports well-controlled blood pressure.  He denies unusual abdomen, back, or flank pain.  Reports medication compliance.    Review of Systems:  Review of Systems   Constitutional: Negative for chills, decreased appetite, diaphoresis, fever, malaise/fatigue, night sweats, weight gain and weight loss.   HENT:  Negative for hoarse voice.    Eyes:  Negative for blurred vision, double vision and visual disturbance.   Cardiovascular:  Negative for chest pain, claudication, dyspnea on exertion, irregular heartbeat, leg swelling, near-syncope, orthopnea, palpitations,  paroxysmal nocturnal dyspnea and syncope.        Leg cramps some nights- left leg      Respiratory:  Negative for cough, hemoptysis, shortness of breath, sputum production and wheezing.    Hematologic/Lymphatic: Negative for adenopathy and bleeding problem. Does not bruise/bleed easily.   Skin:  Negative for color change, nail changes, poor wound healing and rash.   Musculoskeletal:  Negative for back pain, falls and muscle cramps.   Gastrointestinal:  Positive for flatus. Negative for abdominal pain, dysphagia and heartburn.   Genitourinary:  Negative for flank pain.   Neurological:  Negative for brief paralysis, disturbances in coordination, dizziness, focal weakness, headaches, light-headedness, loss of balance, numbness, paresthesias, sensory change, vertigo and weakness.   Psychiatric/Behavioral:  Negative for depression and suicidal ideas. The patient is not nervous/anxious.    Allergic/Immunologic: Negative for persistent infections.     I have reviewed the following portions of the patient's history: problem list, current medications, allergies, past surgical history, past medical history, past social history, past family history, and ROS and confirm it's accurate.    Allergies:  Allergies   Allergen Reactions    Percocet [Oxycodone-Acetaminophen] Nausea Only    Codeine Other (See Comments)     headaches    Morphine And Related Hallucinations       Medications:      Current Outpatient Medications:     acetaminophen (TYLENOL) 650 MG 8 hr tablet, Take 1 tablet by mouth Every 8 (Eight) Hours As Needed for Mild Pain., Disp: , Rfl:     amLODIPine (NORVASC) 2.5 MG tablet, 1 tablet Every Evening., Disp: , Rfl:     cetirizine (zyrTEC) 10 MG tablet, Take 1 tablet by mouth Daily., Disp: , Rfl:     Diclofenac Sodium (VOLTAREN) 1 % gel gel, Apply 4 g topically to the appropriate area as directed 4 (Four) Times a Day As Needed., Disp: , Rfl:     Farxiga 5 MG tablet tablet, Take 1 tablet by mouth Daily., Disp: ,  Rfl:     fenofibrate (TRICOR) 145 MG tablet, Take 1 tablet by mouth Daily., Disp: , Rfl:     fluticasone (FLONASE) 50 MCG/ACT nasal spray, As Needed., Disp: , Rfl:     multivitamin (THERAGRAN) tablet tablet, Take 1 tablet by mouth Daily., Disp: , Rfl:     pantoprazole (PROTONIX) 40 MG EC tablet, Take 1 tablet by mouth., Disp: , Rfl:     Rocklatan 0.02-0.005 % solution, Daily., Disp: , Rfl:     simvastatin (ZOCOR) 20 MG tablet, Take 1 tablet by mouth Every Night., Disp: , Rfl:     sucralfate (CARAFATE) 1 g tablet, Take 1 tablet by mouth 4 (Four) Times a Day., Disp: 120 tablet, Rfl: 12    valsartan (DIOVAN) 320 MG tablet, Take 1 tablet by mouth., Disp: , Rfl:     tadalafil (Cialis) 5 MG tablet, Take 1 tablet by mouth Daily As Needed for Erectile Dysfunction. Take one Daily (Patient not taking: Reported on 8/9/2023), Disp: 30 tablet, Rfl: 6    History:   Past Medical History:   Diagnosis Date    AAA (abdominal aortic aneurysm)     Arthritis     Cancer     Coronary artery disease     Elevated cholesterol     Glaucoma     Hypertension     Prostate cancer     Skin cancer        Past Surgical History:   Procedure Laterality Date    ABDOMINAL AORTIC ANEURYSM REPAIR      ABDOMINAL AORTIC ANEURYSM REPAIR      ABDOMINAL AORTIC ANEURYSM REPAIR      BACK SURGERY      cervical    COLONOSCOPY  05/2019    CYBERKNIFE  01/17/2020    Prostate    ENDOSCOPY      EYE SURGERY Bilateral     iol-Cataract and Scar Tissue removed right eye    GALLBLADDER SURGERY      PROSTATE FIDUCIAL MARKER PLACEMENT N/A 12/16/2019    Procedure: PROSTATE FIDUCIAL MARKER PLACEMENT WITH 6 MARKERS;  Surgeon: Tom Pineda MD;  Location: [facility];  Service: Urology       Social History     Socioeconomic History    Marital status:     Number of children: 3   Tobacco Use    Smoking status: Former     Packs/day: 0.50     Years: 15.00     Pack years: 7.50     Types: Cigarettes     Quit date: 6/15/1981     Years since  quittin.1    Smokeless tobacco: Former     Types: Chew     Quit date: 6/15/1961   Vaping Use    Vaping Use: Never used   Substance and Sexual Activity    Alcohol use: No    Drug use: No    Sexual activity: Defer        Family History   Problem Relation Age of Onset    No Known Problems Father     No Known Problems Mother        Objective     Physical Exam:  Vitals:    23 1323 23 1324   BP: 109/50 110/56   BP Location: Left arm Right arm   Patient Position: Sitting Sitting   Pulse: 75    Temp: 97.5 øF (36.4 øC)    SpO2: 96%    Weight: 72.6 kg (160 lb)       Body mass index is 25.06 kg/mý.    Physical Exam  Vitals reviewed.   Constitutional:       General: He is not in acute distress.     Appearance: Normal appearance. He is not ill-appearing.   Eyes:      Pupils: Pupils are equal, round, and reactive to light.   Cardiovascular:      Rate and Rhythm: Normal rate and regular rhythm.      Heart sounds: No murmur heard.  Pulmonary:      Effort: Pulmonary effort is normal.      Breath sounds: Normal breath sounds.   Abdominal:      General: Bowel sounds are normal.      Palpations: There is no mass.   Skin:     General: Skin is warm and dry.      Capillary Refill: Capillary refill takes less than 2 seconds.   Neurological:      General: No focal deficit present.      Mental Status: He is alert and oriented to person, place, and time.   Psychiatric:         Mood and Affect: Mood normal.         Behavior: Behavior normal.         Thought Content: Thought content normal.         Judgment: Judgment normal.       Imaging/Labs:  US Aorta Limited  Result Date: 2023  No evidence of abdominal aortic aneurysm.  This report was finalized on 2023 3:17 PM by Dr. Gadiel Zeng MD.     US Aorta Limited (2023 15:10)     CT abdomen pelvis with contrast  [facility]  Resulted 2023  Significant abnormality involving the duodenum consistent   with duodenitis or more likely peptic  ulcer disease. The adjacent air   and fluid collection may represent an incidental duodenal diverticulum,   but localized perforation from an ulcer cannot be excluded. Upper   endoscopy is recommended for further evaluation.   CT outside films (08/08/2023 11:24)     ..I personally reviewed this report and imaging.    Assessment / Plan      Assessment / Plan:  PMH significant for former tobacco dependence, prostate cancer, hypertension, hyperlipidemia on statin therapy, and abdominal aortic aneurysm s/p open repair at [facility] in 2001.      1.  Infrarenal abdominal aortic aneurysm s/p open repair, 2001.  Initially referred to Dr. Celeste 6/2021 for surveillance.  Radiology report from CT imaging at that time interpreted a 3.4 infrarenal abdominal aortic aneurysm.  On Dr. Celeste's personal review this appears to be a conduit overlying his native aortic bifurcation, however the study was limited without contrast.  Last seen in office on 7/13/2022 at which time he was doing well.  CTA abdomen/pelvis revealed a 3.2 cm native sac.  Presents to office today for annual surveillance with imaging.  Reports recently being hospitalized for an ulcer.  Normotensive in office.  Reports well-controlled blood pressure at home.  Discussed goal of less than 140/90.  Denies unusual abdomen, back, or flank pain.  Ultrasound with no evidence of abdominal aortic aneurysm.  Reports medication compliance.    Follow Up:   We will plan for follow-up in 1 year with imaging.  Or sooner for any further concerns or worsening sign and symptoms.  Patient encouraged to call the office with any questions or concerns.     Thank you for allowing me to participate in your care.  Best Regards,    CED Mae  [facility] Cardiothoracic Surgery  08/09/23  13:27 EDT

## 2023-11-29 ENCOUNTER — OFFICE VISIT (OUTPATIENT)
Dept: CARDIOLOGY | Facility: CLINIC | Age: 88
End: 2023-11-29
Payer: MEDICARE

## 2023-11-29 VITALS
WEIGHT: 165 LBS | HEIGHT: 67 IN | DIASTOLIC BLOOD PRESSURE: 56 MMHG | BODY MASS INDEX: 25.9 KG/M2 | SYSTOLIC BLOOD PRESSURE: 128 MMHG | HEART RATE: 61 BPM | OXYGEN SATURATION: 97 %

## 2023-11-29 DIAGNOSIS — E78.5 HYPERLIPIDEMIA, UNSPECIFIED HYPERLIPIDEMIA TYPE: ICD-10-CM

## 2023-11-29 DIAGNOSIS — I71.40 ABDOMINAL AORTIC ANEURYSM (AAA) WITHOUT RUPTURE, UNSPECIFIED PART: ICD-10-CM

## 2023-11-29 DIAGNOSIS — I10 HYPERTENSION, UNSPECIFIED TYPE: Primary | ICD-10-CM

## 2023-11-29 PROCEDURE — 99214 OFFICE O/P EST MOD 30 MIN: CPT | Performed by: NURSE PRACTITIONER

## 2023-11-29 PROCEDURE — 93000 ELECTROCARDIOGRAM COMPLETE: CPT | Performed by: NURSE PRACTITIONER

## 2023-11-29 PROCEDURE — 1159F MED LIST DOCD IN RCRD: CPT | Performed by: NURSE PRACTITIONER

## 2023-11-29 PROCEDURE — 1160F RVW MEDS BY RX/DR IN RCRD: CPT | Performed by: NURSE PRACTITIONER

## 2023-11-29 RX ORDER — LATANOPROST 50 UG/ML
1 SOLUTION/ DROPS OPHTHALMIC NIGHTLY
COMMUNITY
Start: 2023-10-11

## 2023-11-29 NOTE — PROGRESS NOTES
Subjective     Celso Mcelroy is a 87 y.o. male.   Chief Complaint   Patient presents with    Hypertension     Follow up     History of Present Illness   Celso Mcelroy is an 87-year-old male who presents to clinic today for cardiology follow-up.  He is accompanied by his son.  He denies any acute complaints and is overall feeling well.    Hypertension currently on Valsartan 320 mg daily and Norvasc 2.5 mg daily. Intermittent monitoring with normal readings. He denies hypotension. Reports compliance with medications. He denies chest pain, dyspnea, palpitations, dizziness or syncope.  He exercises on a regular basis and denies any acute complaint.     Hyperlipidemia on Zocor 20 mg nightly and TriCor 145 mg daily.  He denies myalgias. No recent labs available for review.      S/p abdominal aortic aneurysm following with Dr. Celeste.  He denies abdominal or back pain. History of an abdominal aortic aneurysm repair at the [facility] in 2001. He had US imaging in 2023.     He states he was hospitalized in the summer for some ulcer problems and has recovered is doing much better.        Patient Active Problem List   Diagnosis    Nocturia    Prostate cancer    Erectile dysfunction    AAA (abdominal aortic aneurysm) without rupture s/p open repair 2001     Past Medical History:   Diagnosis Date    AAA (abdominal aortic aneurysm)     Arthritis     Cancer     Coronary artery disease     Elevated cholesterol     Glaucoma     Hypertension     Prostate cancer     Skin cancer      Past Surgical History:   Procedure Laterality Date    ABDOMINAL AORTIC ANEURYSM REPAIR      ABDOMINAL AORTIC ANEURYSM REPAIR      ABDOMINAL AORTIC ANEURYSM REPAIR      BACK SURGERY      cervical    COLONOSCOPY  05/2019    CYBERKNIFE  01/17/2020    Prostate    ENDOSCOPY      EYE SURGERY Bilateral     iol-Cataract and Scar Tissue removed right eye    GALLBLADDER SURGERY      PROSTATE FIDUCIAL MARKER PLACEMENT N/A 12/16/2019    Procedure:  PROSTATE FIDUCIAL MARKER PLACEMENT WITH 6 MARKERS;  Surgeon: Tom Pineda MD;  Location: [facility];  Service: Urology     Family History   Problem Relation Age of Onset    No Known Problems Father     No Known Problems Mother      Social History     Tobacco Use    Smoking status: Former     Packs/day: 0.50     Years: 15.00     Additional pack years: 0.00     Total pack years: 7.50     Types: Cigarettes     Quit date: 6/15/1981     Years since quittin.4    Smokeless tobacco: Former     Types: Chew     Quit date: 6/15/1961   Vaping Use    Vaping Use: Never used   Substance Use Topics    Alcohol use: No    Drug use: No     The following portions of the patient's history were reviewed and updated as appropriate: allergies, current medications, past family history, past medical history, past social history, past surgical history and problem list.    Allergies   Allergen Reactions    Percocet [Oxycodone-Acetaminophen] Nausea Only    Codeine Other (See Comments)     headaches    Morphine And Related Hallucinations       Current Outpatient Medications:     acetaminophen (TYLENOL) 650 MG 8 hr tablet, Take 1 tablet by mouth Every 8 (Eight) Hours As Needed for Mild Pain., Disp: , Rfl:     amLODIPine (NORVASC) 2.5 MG tablet, 1 tablet Every Evening., Disp: , Rfl:     cetirizine (zyrTEC) 10 MG tablet, Take 1 tablet by mouth Daily., Disp: , Rfl:     Diclofenac Sodium (VOLTAREN) 1 % gel gel, Apply 4 g topically to the appropriate area as directed 4 (Four) Times a Day As Needed., Disp: , Rfl:     Farxiga 5 MG tablet tablet, Take 1 tablet by mouth Daily., Disp: , Rfl:     fenofibrate (TRICOR) 145 MG tablet, Take 1 tablet by mouth Daily., Disp: , Rfl:     fluticasone (FLONASE) 50 MCG/ACT nasal spray, As Needed., Disp: , Rfl:     latanoprost (XALATAN) 0.005 % ophthalmic solution, Administer 1 drop to both eyes Every Night., Disp: , Rfl:     multivitamin (THERAGRAN) tablet tablet, Take 1 tablet by mouth Daily., Disp: ,  "Rfl:     pantoprazole (PROTONIX) 40 MG EC tablet, Take 1 tablet by mouth., Disp: , Rfl:     simvastatin (ZOCOR) 20 MG tablet, Take 1 tablet by mouth Every Night., Disp: , Rfl:     sucralfate (CARAFATE) 1 g tablet, Take 1 tablet by mouth 4 (Four) Times a Day., Disp: 120 tablet, Rfl: 12    valsartan (DIOVAN) 320 MG tablet, Take 1 tablet by mouth Daily., Disp: , Rfl:     Review of Systems   Constitutional:  Negative for activity change, appetite change, chills, diaphoresis, fatigue and fever.   HENT:  Negative for congestion, drooling, ear discharge, ear pain, mouth sores, nosebleeds, postnasal drip, rhinorrhea, sinus pressure, sneezing and sore throat.    Eyes:  Negative for pain, discharge and visual disturbance.   Respiratory:  Negative for cough, chest tightness, shortness of breath and wheezing.    Cardiovascular:  Negative for chest pain, palpitations and leg swelling.   Gastrointestinal:  Negative for abdominal pain, constipation, diarrhea, nausea and vomiting.   Endocrine: Negative for cold intolerance, heat intolerance, polydipsia, polyphagia and polyuria.   Musculoskeletal:  Negative for arthralgias, myalgias and neck pain.   Skin:  Negative for rash and wound.   Neurological:  Negative for dizziness, syncope, speech difficulty, weakness, light-headedness and headaches.   Hematological:  Negative for adenopathy. Does not bruise/bleed easily.   Psychiatric/Behavioral:  Negative for confusion, dysphoric mood and sleep disturbance. The patient is not nervous/anxious.    All other systems reviewed and are negative.    /56 (BP Location: Left arm, Patient Position: Sitting, Cuff Size: Adult)   Pulse 61   Ht 170.2 cm (67\")   Wt 74.8 kg (165 lb)   SpO2 97%   BMI 25.84 kg/m²     Objective   Allergies   Allergen Reactions    Percocet [Oxycodone-Acetaminophen] Nausea Only    Codeine Other (See Comments)     headaches    Morphine And Related Hallucinations       Physical Exam  Vitals reviewed. "   Constitutional:       Appearance: Normal appearance. He is well-developed.   HENT:      Head: Normocephalic.   Eyes:      Conjunctiva/sclera: Conjunctivae normal.   Neck:      Thyroid: No thyromegaly.      Vascular: No carotid bruit or JVD.   Cardiovascular:      Rate and Rhythm: Normal rate and regular rhythm.   Pulmonary:      Effort: Pulmonary effort is normal.      Breath sounds: Normal breath sounds.   Musculoskeletal:      Cervical back: Neck supple.      Right lower leg: No edema.      Left lower leg: No edema.      Comments: Uses a cane    Skin:     General: Skin is warm and dry.   Neurological:      Mental Status: He is alert and oriented to person, place, and time.   Psychiatric:         Attention and Perception: Attention normal.         Mood and Affect: Mood normal.         Speech: Speech normal.         Behavior: Behavior normal. Behavior is cooperative.         Cognition and Memory: Cognition normal.           ECG 12 Lead    Date/Time: 11/29/2023 3:46 PM  Performed by: Charlette Lewis APRN    Authorized by: Charlette Lewis APRN  Comparison: compared with previous ECG   Similar to previous ECG  Rhythm: sinus rhythm  Rate: normal  BPM: 61  Conduction: 1st degree AV block    Clinical impression: abnormal EKG  Comments: QT/QTc - 379/383          LABS  WBC   Date Value Ref Range Status   12/09/2019 6.22 3.40 - 10.80 10*3/mm3 Final     RBC   Date Value Ref Range Status   12/09/2019 4.49 4.14 - 5.80 10*6/mm3 Final     Hemoglobin   Date Value Ref Range Status   12/09/2019 14.3 13.0 - 17.7 g/dL Final     Hematocrit   Date Value Ref Range Status   12/09/2019 42.5 37.5 - 51.0 % Final     MCV   Date Value Ref Range Status   12/09/2019 94.7 79.0 - 97.0 fL Final     MCH   Date Value Ref Range Status   12/09/2019 31.8 26.6 - 33.0 pg Final     MCHC   Date Value Ref Range Status   12/09/2019 33.6 31.5 - 35.7 g/dL Final     RDW   Date Value Ref Range Status   12/09/2019 12.2 (L) 12.3 - 15.4 % Final     RDW-SD    Date Value Ref Range Status   12/09/2019 42.6 37.0 - 54.0 fl Final     MPV   Date Value Ref Range Status   12/09/2019 10.5 6.0 - 12.0 fL Final     Platelets   Date Value Ref Range Status   12/09/2019 253 140 - 450 10*3/mm3 Final       Triglycerides   Date Value Ref Range Status   11/16/2022 249 (H) 0 - 149 mg/dL Final     HDL Cholesterol   Date Value Ref Range Status   11/16/2022 38 (L) >39 mg/dL Final     LDL Chol Calc (NIH)   Date Value Ref Range Status   11/16/2022 67 0 - 99 mg/dL Final     IMAGING  XR Chest 1 View    Result Date: 8/13/2023  NG tube is looped in the fundus of the stomach. Images reviewed, interpreted, and dictated by Dr. MISSY Sifuentes. Transcribed by Tadeo Mata PA-C.    CT Abdomen Pelvis With Contrast    Result Date: 8/13/2023  Limited exam of arterial structures. Mild stenosis origin celiac axis. No definite occlusion identified. Dilated small bowel in the left abdomen with a transition point in the mid abdomen. This may be secondary to an earlier partial small bowel obstruction. Images reviewed, interpreted, and dictated by MISSY Sifuentes MD            Assessment & Plan   Diagnoses and all orders for this visit:    1. Hypertension, unspecified type (Primary)  -     ECG 12 Lead  EKG reviewed and discussed, no acute changes  Controlled, continue current therapy, routine monitoring recommended, sodium restrictions recommended    2. Hyperlipidemia, unspecified hyperlipidemia type  Continue on statin, heart healthy diet, LDL goal less than 70.    3. Abdominal aortic aneurysm (AAA) without rupture, unspecified part  Stable, asymptomatic.  Patient continues to follow routinely with Dr. Celeste.      Review of medical record    Lifestyle modifications including heart healthy diet, regular exercise, maintenance of desirable body weight and avoidance of tobacco products.    Follow-up in 6 months, sooner if needed.

## 2023-11-29 NOTE — LETTER
November 29, 2023     Emilie Rausch MD  [address] [facility] [address]  [facility] [address]    Patient: Celso Mcelroy   YOB: 1935   Date of Visit: 11/29/2023       Dear Emilie Rausch MD    Celso Mcelroy was in my office today. Below is a copy of my note.    If you have questions, please do not hesitate to call me. I look forward to following Celso along with you.         Sincerely,        CED Fonseca        CC: No Recipients    Subjective    Celso Mcelroy is a 87 y.o. male.   Chief Complaint   Patient presents with   • Hypertension     Follow up     History of Present Illness   Celso Mcelroy is an 87-year-old male who presents to clinic today for cardiology follow-up.  He is accompanied by his son.  He denies any acute complaints and is overall feeling well.    Hypertension currently on Valsartan 320 mg daily and Norvasc 2.5 mg daily. Intermittent monitoring with normal readings. He denies hypotension. Reports compliance with medications. He denies chest pain, dyspnea, palpitations, dizziness or syncope.  He exercises on a regular basis and denies any acute complaint.     Hyperlipidemia on Zocor 20 mg nightly and TriCor 145 mg daily.  He denies myalgias. No recent labs available for review.      S/p abdominal aortic aneurysm following with Dr. Celeste.  He denies abdominal or back pain. History of an abdominal aortic aneurysm repair at the [facility] in 2001. He had US imaging in 2023.     He states he was hospitalized in the summer for some ulcer problems and has recovered is doing much better.        Patient Active Problem List   Diagnosis   • Nocturia   • Prostate cancer   • Erectile dysfunction   • AAA (abdominal aortic aneurysm) without rupture s/p open repair 2001     Past Medical History:   Diagnosis Date   • AAA (abdominal aortic aneurysm)    • Arthritis    • Cancer    • Coronary artery disease    • Elevated cholesterol    • Glaucoma    • Hypertension    •  Prostate cancer    • Skin cancer      Past Surgical History:   Procedure Laterality Date   • ABDOMINAL AORTIC ANEURYSM REPAIR     • ABDOMINAL AORTIC ANEURYSM REPAIR     • ABDOMINAL AORTIC ANEURYSM REPAIR     • BACK SURGERY      cervical   • COLONOSCOPY  2019   • CYBERKNIFE  2020    Prostate   • ENDOSCOPY     • EYE SURGERY Bilateral     iol-Cataract and Scar Tissue removed right eye   • GALLBLADDER SURGERY     • PROSTATE FIDUCIAL MARKER PLACEMENT N/A 2019    Procedure: PROSTATE FIDUCIAL MARKER PLACEMENT WITH 6 MARKERS;  Surgeon: Tom Pineda MD;  Location: [facility];  Service: Urology     Family History   Problem Relation Age of Onset   • No Known Problems Father    • No Known Problems Mother      Social History     Tobacco Use   • Smoking status: Former     Packs/day: 0.50     Years: 15.00     Additional pack years: 0.00     Total pack years: 7.50     Types: Cigarettes     Quit date: 6/15/1981     Years since quittin.4   • Smokeless tobacco: Former     Types: Chew     Quit date: 6/15/1961   Vaping Use   • Vaping Use: Never used   Substance Use Topics   • Alcohol use: No   • Drug use: No     The following portions of the patient's history were reviewed and updated as appropriate: allergies, current medications, past family history, past medical history, past social history, past surgical history and problem list.    Allergies   Allergen Reactions   • Percocet [Oxycodone-Acetaminophen] Nausea Only   • Codeine Other (See Comments)     headaches   • Morphine And Related Hallucinations       Current Outpatient Medications:   •  acetaminophen (TYLENOL) 650 MG 8 hr tablet, Take 1 tablet by mouth Every 8 (Eight) Hours As Needed for Mild Pain., Disp: , Rfl:   •  amLODIPine (NORVASC) 2.5 MG tablet, 1 tablet Every Evening., Disp: , Rfl:   •  cetirizine (zyrTEC) 10 MG tablet, Take 1 tablet by mouth Daily., Disp: , Rfl:   •  Diclofenac Sodium (VOLTAREN) 1 % gel gel, Apply 4 g topically to the  appropriate area as directed 4 (Four) Times a Day As Needed., Disp: , Rfl:   •  Farxiga 5 MG tablet tablet, Take 1 tablet by mouth Daily., Disp: , Rfl:   •  fenofibrate (TRICOR) 145 MG tablet, Take 1 tablet by mouth Daily., Disp: , Rfl:   •  fluticasone (FLONASE) 50 MCG/ACT nasal spray, As Needed., Disp: , Rfl:   •  latanoprost (XALATAN) 0.005 % ophthalmic solution, Administer 1 drop to both eyes Every Night., Disp: , Rfl:   •  multivitamin (THERAGRAN) tablet tablet, Take 1 tablet by mouth Daily., Disp: , Rfl:   •  pantoprazole (PROTONIX) 40 MG EC tablet, Take 1 tablet by mouth., Disp: , Rfl:   •  simvastatin (ZOCOR) 20 MG tablet, Take 1 tablet by mouth Every Night., Disp: , Rfl:   •  sucralfate (CARAFATE) 1 g tablet, Take 1 tablet by mouth 4 (Four) Times a Day., Disp: 120 tablet, Rfl: 12  •  valsartan (DIOVAN) 320 MG tablet, Take 1 tablet by mouth Daily., Disp: , Rfl:     Review of Systems   Constitutional:  Negative for activity change, appetite change, chills, diaphoresis, fatigue and fever.   HENT:  Negative for congestion, drooling, ear discharge, ear pain, mouth sores, nosebleeds, postnasal drip, rhinorrhea, sinus pressure, sneezing and sore throat.    Eyes:  Negative for pain, discharge and visual disturbance.   Respiratory:  Negative for cough, chest tightness, shortness of breath and wheezing.    Cardiovascular:  Negative for chest pain, palpitations and leg swelling.   Gastrointestinal:  Negative for abdominal pain, constipation, diarrhea, nausea and vomiting.   Endocrine: Negative for cold intolerance, heat intolerance, polydipsia, polyphagia and polyuria.   Musculoskeletal:  Negative for arthralgias, myalgias and neck pain.   Skin:  Negative for rash and wound.   Neurological:  Negative for dizziness, syncope, speech difficulty, weakness, light-headedness and headaches.   Hematological:  Negative for adenopathy. Does not bruise/bleed easily.   Psychiatric/Behavioral:  Negative for confusion, dysphoric  "mood and sleep disturbance. The patient is not nervous/anxious.    All other systems reviewed and are negative.    /56 (BP Location: Left arm, Patient Position: Sitting, Cuff Size: Adult)   Pulse 61   Ht 170.2 cm (67\")   Wt 74.8 kg (165 lb)   SpO2 97%   BMI 25.84 kg/m²     Objective  Allergies   Allergen Reactions   • Percocet [Oxycodone-Acetaminophen] Nausea Only   • Codeine Other (See Comments)     headaches   • Morphine And Related Hallucinations       Physical Exam  Vitals reviewed.   Constitutional:       Appearance: Normal appearance. He is well-developed.   HENT:      Head: Normocephalic.   Eyes:      Conjunctiva/sclera: Conjunctivae normal.   Neck:      Thyroid: No thyromegaly.      Vascular: No carotid bruit or JVD.   Cardiovascular:      Rate and Rhythm: Normal rate and regular rhythm.   Pulmonary:      Effort: Pulmonary effort is normal.      Breath sounds: Normal breath sounds.   Musculoskeletal:      Cervical back: Neck supple.      Right lower leg: No edema.      Left lower leg: No edema.      Comments: Uses a cane    Skin:     General: Skin is warm and dry.   Neurological:      Mental Status: He is alert and oriented to person, place, and time.   Psychiatric:         Attention and Perception: Attention normal.         Mood and Affect: Mood normal.         Speech: Speech normal.         Behavior: Behavior normal. Behavior is cooperative.         Cognition and Memory: Cognition normal.           ECG 12 Lead    Date/Time: 11/29/2023 3:46 PM  Performed by: Charlette Lewis APRN    Authorized by: Charlette Lewis APRN  Comparison: compared with previous ECG   Similar to previous ECG  Rhythm: sinus rhythm  Rate: normal  BPM: 61  Conduction: 1st degree AV block    Clinical impression: abnormal EKG  Comments: QT/QTc - 379/383          LABS  WBC   Date Value Ref Range Status   12/09/2019 6.22 3.40 - 10.80 10*3/mm3 Final     RBC   Date Value Ref Range Status   12/09/2019 4.49 4.14 - 5.80 10*6/mm3 " Final     Hemoglobin   Date Value Ref Range Status   12/09/2019 14.3 13.0 - 17.7 g/dL Final     Hematocrit   Date Value Ref Range Status   12/09/2019 42.5 37.5 - 51.0 % Final     MCV   Date Value Ref Range Status   12/09/2019 94.7 79.0 - 97.0 fL Final     MCH   Date Value Ref Range Status   12/09/2019 31.8 26.6 - 33.0 pg Final     MCHC   Date Value Ref Range Status   12/09/2019 33.6 31.5 - 35.7 g/dL Final     RDW   Date Value Ref Range Status   12/09/2019 12.2 (L) 12.3 - 15.4 % Final     RDW-SD   Date Value Ref Range Status   12/09/2019 42.6 37.0 - 54.0 fl Final     MPV   Date Value Ref Range Status   12/09/2019 10.5 6.0 - 12.0 fL Final     Platelets   Date Value Ref Range Status   12/09/2019 253 140 - 450 10*3/mm3 Final       Triglycerides   Date Value Ref Range Status   11/16/2022 249 (H) 0 - 149 mg/dL Final     HDL Cholesterol   Date Value Ref Range Status   11/16/2022 38 (L) >39 mg/dL Final     LDL Chol Calc (NIH)   Date Value Ref Range Status   11/16/2022 67 0 - 99 mg/dL Final     IMAGING  XR Chest 1 View    Result Date: 8/13/2023  NG tube is looped in the fundus of the stomach. Images reviewed, interpreted, and dictated by Dr. MISSY Sifuentes. Transcribed by Tadeo Mata PA-C.    CT Abdomen Pelvis With Contrast    Result Date: 8/13/2023  Limited exam of arterial structures. Mild stenosis origin celiac axis. No definite occlusion identified. Dilated small bowel in the left abdomen with a transition point in the mid abdomen. This may be secondary to an earlier partial small bowel obstruction. Images reviewed, interpreted, and dictated by MISSY Sifuentes MD            Assessment & Plan  Diagnoses and all orders for this visit:    1. Hypertension, unspecified type (Primary)  -     ECG 12 Lead  EKG reviewed and discussed, no acute changes  Controlled, continue current therapy, routine monitoring recommended, sodium restrictions recommended    2. Hyperlipidemia, unspecified hyperlipidemia type  Continue  on statin, heart healthy diet, LDL goal less than 70.    3. Abdominal aortic aneurysm (AAA) without rupture, unspecified part  Stable, asymptomatic.  Patient continues to follow routinely with Dr. Celeste.      Review of medical record    Lifestyle modifications including heart healthy diet, regular exercise, maintenance of desirable body weight and avoidance of tobacco products.    Follow-up in 6 months, sooner if needed.

## 2024-05-28 ENCOUNTER — OFFICE VISIT (OUTPATIENT)
Dept: CARDIOLOGY | Facility: CLINIC | Age: 89
End: 2024-05-28
Payer: MEDICARE

## 2024-05-28 VITALS
SYSTOLIC BLOOD PRESSURE: 120 MMHG | OXYGEN SATURATION: 93 % | HEIGHT: 67 IN | HEART RATE: 68 BPM | BODY MASS INDEX: 25.74 KG/M2 | WEIGHT: 164 LBS | DIASTOLIC BLOOD PRESSURE: 68 MMHG

## 2024-05-28 DIAGNOSIS — I10 HYPERTENSION, UNSPECIFIED TYPE: Primary | ICD-10-CM

## 2024-05-28 DIAGNOSIS — Z91.89 MULTIPLE RISK FACTORS FOR CORONARY ARTERY DISEASE: ICD-10-CM

## 2024-05-28 DIAGNOSIS — I71.40 ABDOMINAL AORTIC ANEURYSM (AAA) WITHOUT RUPTURE, UNSPECIFIED PART: ICD-10-CM

## 2024-05-28 DIAGNOSIS — D64.9 ANEMIA, UNSPECIFIED TYPE: ICD-10-CM

## 2024-05-28 DIAGNOSIS — E78.5 HYPERLIPIDEMIA, UNSPECIFIED HYPERLIPIDEMIA TYPE: ICD-10-CM

## 2024-05-28 PROCEDURE — 1160F RVW MEDS BY RX/DR IN RCRD: CPT | Performed by: NURSE PRACTITIONER

## 2024-05-28 PROCEDURE — 99214 OFFICE O/P EST MOD 30 MIN: CPT | Performed by: NURSE PRACTITIONER

## 2024-05-28 PROCEDURE — 1159F MED LIST DOCD IN RCRD: CPT | Performed by: NURSE PRACTITIONER

## 2024-05-28 RX ORDER — FERROUS SULFATE 325(65) MG
1 TABLET ORAL
COMMUNITY
Start: 2024-05-15

## 2024-05-28 NOTE — LETTER
June 7, 2024     Emilie Rausch MD  [address] [address]    Patient: Celso Mcelroy   YOB: 1935   Date of Visit: 5/28/2024       Dear Emilie Rausch MD    Celso Mcelroy was in my office today. Below is a copy of my note.    If you have questions, please do not hesitate to call me. I look forward to following Celso along with you.         Sincerely,        CED Fonseca        CC: No Recipients    Subjective    Celso Mcelroy is a 88 y.o. male.   Chief Complaint   Patient presents with   • Hypertension   • Hyperlipidemia   • Leg Cramps     History of Present Illness   Celso Mcelroy is an 88 y.o. male who presents to clinic today for cardiology follow-up.  He is accompanied by his son.    Hypertension currently on Valsartan 320 mg daily and Norvasc 2.5 mg daily. Intermittent monitoring with normal readings. Denies hypotension. Reports compliance with medications. Denies chest pain, dyspnea, palpitations, dizziness or syncope.  He exercises on a regular basis and denies any acute complaint.     Hyperlipidemia on Zocor 20 mg nightly and TriCor 145 mg daily.  He denies myalgias.     S/p abdominal aortic aneurysm following with Dr. Celeste.  He denies abdominal or back pain. History of an abdominal aortic aneurysm repair at the [facility] in 2001.      He reports recently he was seen at [facility] for weakness and it was determined that he had anemia he states he is following closely with primary and is currently on iron supplementation.  He continues to have leg cramps but noticed recently improved with iron supplementation.    Patient Active Problem List   Diagnosis   • Nocturia   • Prostate cancer   • Erectile dysfunction   • AAA (abdominal aortic aneurysm) without rupture s/p open repair 2001     Past Medical History:   Diagnosis Date   • AAA (abdominal aortic aneurysm)    • Arthritis    • Cancer    • Coronary artery disease    • Elevated  cholesterol    • Glaucoma    • Hypertension    • Prostate cancer    • Skin cancer      Past Surgical History:   Procedure Laterality Date   • ABDOMINAL AORTIC ANEURYSM REPAIR     • ABDOMINAL AORTIC ANEURYSM REPAIR     • ABDOMINAL AORTIC ANEURYSM REPAIR     • BACK SURGERY      cervical   • COLONOSCOPY  2019   • CYBERKNIFE  2020    Prostate   • ENDOSCOPY     • EYE SURGERY Bilateral     iol-Cataract and Scar Tissue removed right eye   • GALLBLADDER SURGERY     • PROSTATE FIDUCIAL MARKER PLACEMENT N/A 2019    Procedure: PROSTATE FIDUCIAL MARKER PLACEMENT WITH 6 MARKERS;  Surgeon: Tom Pineda MD;  Location: [facility];  Service: Urology     Family History   Problem Relation Age of Onset   • No Known Problems Father    • No Known Problems Mother      Social History     Tobacco Use   • Smoking status: Former     Current packs/day: 0.00     Average packs/day: 0.5 packs/day for 15.0 years (7.5 ttl pk-yrs)     Types: Cigarettes     Start date: 6/15/1966     Quit date: 6/15/1981     Years since quittin.0     Passive exposure: Past   • Smokeless tobacco: Former     Types: Chew     Quit date: 6/15/1961   Vaping Use   • Vaping status: Never Used   Substance Use Topics   • Alcohol use: No   • Drug use: No     The following portions of the patient's history were reviewed and updated as appropriate: allergies, current medications, past family history, past medical history, past social history, past surgical history and problem list.    Allergies   Allergen Reactions   • Percocet [Oxycodone-Acetaminophen] Nausea Only   • Codeine Other (See Comments)     headaches   • Morphine And Codeine Hallucinations       Current Outpatient Medications:   •  acetaminophen (TYLENOL) 650 MG 8 hr tablet, Take 1 tablet by mouth Every 8 (Eight) Hours As Needed for Mild Pain., Disp: , Rfl:   •  amLODIPine (NORVASC) 2.5 MG tablet, 1 tablet Every Evening., Disp: , Rfl:   •  cetirizine (zyrTEC) 10 MG tablet, Take 1 tablet by  mouth Daily., Disp: , Rfl:   •  Diclofenac Sodium (VOLTAREN) 1 % gel gel, Apply 4 g topically to the appropriate area as directed 4 (Four) Times a Day As Needed., Disp: , Rfl:   •  Farxiga 5 MG tablet tablet, Take 1 tablet by mouth Daily., Disp: , Rfl:   •  fenofibrate (TRICOR) 145 MG tablet, Take 1 tablet by mouth Daily., Disp: , Rfl:   •  FeroSul 325 (65 Fe) MG tablet, Take 1 tablet by mouth Every Other Day., Disp: , Rfl:   •  fluticasone (FLONASE) 50 MCG/ACT nasal spray, As Needed., Disp: , Rfl:   •  latanoprost (XALATAN) 0.005 % ophthalmic solution, Administer 1 drop to both eyes Every Night., Disp: , Rfl:   •  multivitamin (THERAGRAN) tablet tablet, Take 1 tablet by mouth Daily., Disp: , Rfl:   •  pantoprazole (PROTONIX) 40 MG EC tablet, Take 1 tablet by mouth., Disp: , Rfl:   •  simvastatin (ZOCOR) 20 MG tablet, Take 1 tablet by mouth Every Night., Disp: , Rfl:   •  sucralfate (CARAFATE) 1 g tablet, Take 1 tablet by mouth 4 (Four) Times a Day., Disp: 120 tablet, Rfl: 12  •  valsartan (DIOVAN) 320 MG tablet, Take 1 tablet by mouth Daily., Disp: , Rfl:     Review of Systems   Constitutional:  Positive for fatigue. Negative for activity change, appetite change, chills, diaphoresis and fever.   HENT:  Negative for congestion, drooling, ear discharge, ear pain, mouth sores, nosebleeds, postnasal drip, rhinorrhea, sinus pressure, sneezing and sore throat.    Eyes:  Negative for pain, discharge and visual disturbance.   Respiratory:  Negative for cough, chest tightness, shortness of breath and wheezing.    Cardiovascular:  Negative for chest pain, palpitations and leg swelling.   Gastrointestinal:  Negative for abdominal pain, constipation, diarrhea, nausea and vomiting.   Endocrine: Negative for cold intolerance, heat intolerance, polydipsia, polyphagia and polyuria.   Musculoskeletal:  Negative for arthralgias, myalgias and neck pain.        Leg cramps    Skin:  Negative for rash and wound.   Neurological:   "Negative for dizziness, syncope, speech difficulty, weakness, light-headedness and headaches.   Hematological:  Negative for adenopathy. Does not bruise/bleed easily.   Psychiatric/Behavioral:  Negative for confusion, dysphoric mood and sleep disturbance. The patient is not nervous/anxious.    All other systems reviewed and are negative.    /68 (BP Location: Left arm, Patient Position: Sitting, Cuff Size: Adult)   Pulse 68   Ht 170.2 cm (67\")   Wt 74.4 kg (164 lb)   SpO2 93%   BMI 25.69 kg/m²     Objective  Allergies   Allergen Reactions   • Percocet [Oxycodone-Acetaminophen] Nausea Only   • Codeine Other (See Comments)     headaches   • Morphine And Codeine Hallucinations     Physical Exam  Vitals reviewed.   Constitutional:       Appearance: Normal appearance. He is well-developed.      Comments: Uses a cane    HENT:      Head: Normocephalic.   Eyes:      Conjunctiva/sclera: Conjunctivae normal.   Neck:      Thyroid: No thyromegaly.      Vascular: No carotid bruit or JVD.   Cardiovascular:      Rate and Rhythm: Normal rate and regular rhythm.   Pulmonary:      Effort: Pulmonary effort is normal.      Breath sounds: Normal breath sounds.   Musculoskeletal:      Cervical back: Neck supple.      Right lower leg: No edema.      Left lower leg: No edema.   Skin:     General: Skin is warm and dry.   Neurological:      Mental Status: He is alert and oriented to person, place, and time.   Psychiatric:         Attention and Perception: Attention normal.         Mood and Affect: Mood normal.         Speech: Speech normal.         Behavior: Behavior normal. Behavior is cooperative.         Cognition and Memory: Cognition normal.         LABS  WBC   Date Value Ref Range Status   12/09/2019 6.22 3.40 - 10.80 10*3/mm3 Final     RBC   Date Value Ref Range Status   12/09/2019 4.49 4.14 - 5.80 10*6/mm3 Final     Hemoglobin   Date Value Ref Range Status   12/09/2019 14.3 13.0 - 17.7 g/dL Final     Hematocrit   Date Value " Ref Range Status   12/09/2019 42.5 37.5 - 51.0 % Final     MCV   Date Value Ref Range Status   12/09/2019 94.7 79.0 - 97.0 fL Final     MCH   Date Value Ref Range Status   12/09/2019 31.8 26.6 - 33.0 pg Final     MCHC   Date Value Ref Range Status   12/09/2019 33.6 31.5 - 35.7 g/dL Final     RDW   Date Value Ref Range Status   12/09/2019 12.2 (L) 12.3 - 15.4 % Final     RDW-SD   Date Value Ref Range Status   12/09/2019 42.6 37.0 - 54.0 fl Final     MPV   Date Value Ref Range Status   12/09/2019 10.5 6.0 - 12.0 fL Final     Platelets   Date Value Ref Range Status   12/09/2019 253 140 - 450 10*3/mm3 Final       Triglycerides   Date Value Ref Range Status   11/16/2022 249 (H) 0 - 149 mg/dL Final     HDL Cholesterol   Date Value Ref Range Status   11/16/2022 38 (L) >39 mg/dL Final     LDL Chol Calc (NIH)   Date Value Ref Range Status   11/16/2022 67 0 - 99 mg/dL Final     IMAGING   XR Chest 1 View    Result Date: 5/12/2024  No acute cardiopulmonary findings. Images personally reviewed, interpreted and dictated by SUYAPA Childress M.D.    US CAROTID BLOOD FLOW BILATERAL    Result Date: 3/12/2024  No significant abnormality. Images reviewed, interpreted and dictated by Dr. Celso Dubois MD            Assessment & Plan  Diagnoses and all orders for this visit:    1. Hypertension, unspecified type (Primary)  Well-controlled, continue current therapy  Routine monitoring recommend  Sodium restrictions recommend    2. Hyperlipidemia, unspecified hyperlipidemia type  Continue on statin, management through PCP, heart healthy diet, LDL around 70    3. Anemia, unspecified type  Management by primary, he remains on iron supplementation    4. Abdominal aortic aneurysm (AAA) without rupture, unspecified part  Continue to follow with cardiothoracic surgery    5.  Multiple risk factors for coronary artery disease  Patient is clinically asymptomatic, will continue to monitor with further evaluation as warranted.       Review of medical  record including lab    Lifestyle modifications including heart healthy diet, regular exercise, maintenance of desirable body weight and avoidance of tobacco product    Follow-up in 6 months with EKG, sooner if needed.

## 2024-06-07 NOTE — PROGRESS NOTES
Subjective     Celso Mcelroy is a 88 y.o. male.   Chief Complaint   Patient presents with    Hypertension    Hyperlipidemia    Leg Cramps     History of Present Illness   Celso Mcelroy is an 88 y.o. male who presents to clinic today for cardiology follow-up.  He is accompanied by his son.    Hypertension currently on Valsartan 320 mg daily and Norvasc 2.5 mg daily. Intermittent monitoring with normal readings. Denies hypotension. Reports compliance with medications. Denies chest pain, dyspnea, palpitations, dizziness or syncope.  He exercises on a regular basis and denies any acute complaint.     Hyperlipidemia on Zocor 20 mg nightly and TriCor 145 mg daily.  He denies myalgias.     S/p abdominal aortic aneurysm following with Dr. Celeste.  He denies abdominal or back pain. History of an abdominal aortic aneurysm repair at the [facility] in 2001.      He reports recently he was seen at [facility] for weakness and it was determined that he had anemia he states he is following closely with primary and is currently on iron supplementation.  He continues to have leg cramps but noticed recently improved with iron supplementation.    Patient Active Problem List   Diagnosis    Nocturia    Prostate cancer    Erectile dysfunction    AAA (abdominal aortic aneurysm) without rupture s/p open repair 2001     Past Medical History:   Diagnosis Date    AAA (abdominal aortic aneurysm)     Arthritis     Cancer     Coronary artery disease     Elevated cholesterol     Glaucoma     Hypertension     Prostate cancer     Skin cancer      Past Surgical History:   Procedure Laterality Date    ABDOMINAL AORTIC ANEURYSM REPAIR      ABDOMINAL AORTIC ANEURYSM REPAIR      ABDOMINAL AORTIC ANEURYSM REPAIR      BACK SURGERY      cervical    COLONOSCOPY  05/2019    CYBERKNIFE  01/17/2020    Prostate    ENDOSCOPY      EYE SURGERY Bilateral     iol-Cataract and Scar Tissue removed right eye    GALLBLADDER SURGERY      PROSTATE  FIDUCIAL MARKER PLACEMENT N/A 2019    Procedure: PROSTATE FIDUCIAL MARKER PLACEMENT WITH 6 MARKERS;  Surgeon: Tom Pineda MD;  Location: [facility];  Service: Urology     Family History   Problem Relation Age of Onset    No Known Problems Father     No Known Problems Mother      Social History     Tobacco Use    Smoking status: Former     Current packs/day: 0.00     Average packs/day: 0.5 packs/day for 15.0 years (7.5 ttl pk-yrs)     Types: Cigarettes     Start date: 6/15/1966     Quit date: 6/15/1981     Years since quittin.0     Passive exposure: Past    Smokeless tobacco: Former     Types: Chew     Quit date: 6/15/1961   Vaping Use    Vaping status: Never Used   Substance Use Topics    Alcohol use: No    Drug use: No     The following portions of the patient's history were reviewed and updated as appropriate: allergies, current medications, past family history, past medical history, past social history, past surgical history and problem list.    Allergies   Allergen Reactions    Percocet [Oxycodone-Acetaminophen] Nausea Only    Codeine Other (See Comments)     headaches    Morphine And Codeine Hallucinations       Current Outpatient Medications:     acetaminophen (TYLENOL) 650 MG 8 hr tablet, Take 1 tablet by mouth Every 8 (Eight) Hours As Needed for Mild Pain., Disp: , Rfl:     amLODIPine (NORVASC) 2.5 MG tablet, 1 tablet Every Evening., Disp: , Rfl:     cetirizine (zyrTEC) 10 MG tablet, Take 1 tablet by mouth Daily., Disp: , Rfl:     Diclofenac Sodium (VOLTAREN) 1 % gel gel, Apply 4 g topically to the appropriate area as directed 4 (Four) Times a Day As Needed., Disp: , Rfl:     Farxiga 5 MG tablet tablet, Take 1 tablet by mouth Daily., Disp: , Rfl:     fenofibrate (TRICOR) 145 MG tablet, Take 1 tablet by mouth Daily., Disp: , Rfl:     FeroSul 325 (65 Fe) MG tablet, Take 1 tablet by mouth Every Other Day., Disp: , Rfl:     fluticasone (FLONASE) 50 MCG/ACT nasal spray, As Needed., Disp: ,  "Rfl:     latanoprost (XALATAN) 0.005 % ophthalmic solution, Administer 1 drop to both eyes Every Night., Disp: , Rfl:     multivitamin (THERAGRAN) tablet tablet, Take 1 tablet by mouth Daily., Disp: , Rfl:     pantoprazole (PROTONIX) 40 MG EC tablet, Take 1 tablet by mouth., Disp: , Rfl:     simvastatin (ZOCOR) 20 MG tablet, Take 1 tablet by mouth Every Night., Disp: , Rfl:     sucralfate (CARAFATE) 1 g tablet, Take 1 tablet by mouth 4 (Four) Times a Day., Disp: 120 tablet, Rfl: 12    valsartan (DIOVAN) 320 MG tablet, Take 1 tablet by mouth Daily., Disp: , Rfl:     Review of Systems   Constitutional:  Positive for fatigue. Negative for activity change, appetite change, chills, diaphoresis and fever.   HENT:  Negative for congestion, drooling, ear discharge, ear pain, mouth sores, nosebleeds, postnasal drip, rhinorrhea, sinus pressure, sneezing and sore throat.    Eyes:  Negative for pain, discharge and visual disturbance.   Respiratory:  Negative for cough, chest tightness, shortness of breath and wheezing.    Cardiovascular:  Negative for chest pain, palpitations and leg swelling.   Gastrointestinal:  Negative for abdominal pain, constipation, diarrhea, nausea and vomiting.   Endocrine: Negative for cold intolerance, heat intolerance, polydipsia, polyphagia and polyuria.   Musculoskeletal:  Negative for arthralgias, myalgias and neck pain.        Leg cramps    Skin:  Negative for rash and wound.   Neurological:  Negative for dizziness, syncope, speech difficulty, weakness, light-headedness and headaches.   Hematological:  Negative for adenopathy. Does not bruise/bleed easily.   Psychiatric/Behavioral:  Negative for confusion, dysphoric mood and sleep disturbance. The patient is not nervous/anxious.    All other systems reviewed and are negative.    /68 (BP Location: Left arm, Patient Position: Sitting, Cuff Size: Adult)   Pulse 68   Ht 170.2 cm (67\")   Wt 74.4 kg (164 lb)   SpO2 93%   BMI 25.69 kg/m² "     Objective   Allergies   Allergen Reactions    Percocet [Oxycodone-Acetaminophen] Nausea Only    Codeine Other (See Comments)     headaches    Morphine And Codeine Hallucinations     Physical Exam  Vitals reviewed.   Constitutional:       Appearance: Normal appearance. He is well-developed.      Comments: Uses a cane    HENT:      Head: Normocephalic.   Eyes:      Conjunctiva/sclera: Conjunctivae normal.   Neck:      Thyroid: No thyromegaly.      Vascular: No carotid bruit or JVD.   Cardiovascular:      Rate and Rhythm: Normal rate and regular rhythm.   Pulmonary:      Effort: Pulmonary effort is normal.      Breath sounds: Normal breath sounds.   Musculoskeletal:      Cervical back: Neck supple.      Right lower leg: No edema.      Left lower leg: No edema.   Skin:     General: Skin is warm and dry.   Neurological:      Mental Status: He is alert and oriented to person, place, and time.   Psychiatric:         Attention and Perception: Attention normal.         Mood and Affect: Mood normal.         Speech: Speech normal.         Behavior: Behavior normal. Behavior is cooperative.         Cognition and Memory: Cognition normal.         LABS  WBC   Date Value Ref Range Status   12/09/2019 6.22 3.40 - 10.80 10*3/mm3 Final     RBC   Date Value Ref Range Status   12/09/2019 4.49 4.14 - 5.80 10*6/mm3 Final     Hemoglobin   Date Value Ref Range Status   12/09/2019 14.3 13.0 - 17.7 g/dL Final     Hematocrit   Date Value Ref Range Status   12/09/2019 42.5 37.5 - 51.0 % Final     MCV   Date Value Ref Range Status   12/09/2019 94.7 79.0 - 97.0 fL Final     MCH   Date Value Ref Range Status   12/09/2019 31.8 26.6 - 33.0 pg Final     MCHC   Date Value Ref Range Status   12/09/2019 33.6 31.5 - 35.7 g/dL Final     RDW   Date Value Ref Range Status   12/09/2019 12.2 (L) 12.3 - 15.4 % Final     RDW-SD   Date Value Ref Range Status   12/09/2019 42.6 37.0 - 54.0 fl Final     MPV   Date Value Ref Range Status   12/09/2019 10.5 6.0 -  12.0 fL Final     Platelets   Date Value Ref Range Status   12/09/2019 253 140 - 450 10*3/mm3 Final       Triglycerides   Date Value Ref Range Status   11/16/2022 249 (H) 0 - 149 mg/dL Final     HDL Cholesterol   Date Value Ref Range Status   11/16/2022 38 (L) >39 mg/dL Final     LDL Chol Calc (NIH)   Date Value Ref Range Status   11/16/2022 67 0 - 99 mg/dL Final     IMAGING   XR Chest 1 View    Result Date: 5/12/2024  No acute cardiopulmonary findings. Images personally reviewed, interpreted and dictated by SUYAPA Childress M.D.    US CAROTID BLOOD FLOW BILATERAL    Result Date: 3/12/2024  No significant abnormality. Images reviewed, interpreted and dictated by Dr. Celso Dubois MD            Assessment & Plan   Diagnoses and all orders for this visit:    1. Hypertension, unspecified type (Primary)  Well-controlled, continue current therapy  Routine monitoring recommend  Sodium restrictions recommend    2. Hyperlipidemia, unspecified hyperlipidemia type  Continue on statin, management through PCP, heart healthy diet, LDL around 70    3. Anemia, unspecified type  Management by primary, he remains on iron supplementation    4. Abdominal aortic aneurysm (AAA) without rupture, unspecified part  Continue to follow with cardiothoracic surgery    5.  Multiple risk factors for coronary artery disease  Patient is clinically asymptomatic, will continue to monitor with further evaluation as warranted.       Review of medical record including lab    Lifestyle modifications including heart healthy diet, regular exercise, maintenance of desirable body weight and avoidance of tobacco product    Follow-up in 6 months with EKG, sooner if needed.

## 2024-08-14 DIAGNOSIS — I71.40 ABDOMINAL AORTIC ANEURYSM (AAA) WITHOUT RUPTURE, UNSPECIFIED PART: Primary | ICD-10-CM

## 2024-09-05 ENCOUNTER — HOSPITAL ENCOUNTER (OUTPATIENT)
Dept: CT IMAGING | Facility: HOSPITAL | Age: 89
Discharge: HOME | End: 2024-09-05
Admitting: NURSE PRACTITIONER
Payer: MEDICARE

## 2024-09-05 DIAGNOSIS — I71.40 ABDOMINAL AORTIC ANEURYSM (AAA) WITHOUT RUPTURE, UNSPECIFIED PART: ICD-10-CM

## 2024-09-05 LAB — CREAT BLDA-MCNC: 0.9 MG/DL (ref 0.6–1.3)

## 2024-09-05 PROCEDURE — 82565 ASSAY OF CREATININE: CPT

## 2024-09-05 PROCEDURE — 74174 CTA ABD&PLVS W/CONTRAST: CPT

## 2024-09-05 PROCEDURE — 25510000001 IOPAMIDOL 61 % SOLUTION: Performed by: NURSE PRACTITIONER

## 2024-09-05 RX ORDER — IOPAMIDOL 612 MG/ML
100 INJECTION, SOLUTION INTRAVASCULAR
Status: COMPLETED | OUTPATIENT
Start: 2024-09-05 | End: 2024-09-05

## 2024-09-05 RX ADMIN — IOPAMIDOL 100 ML: 612 INJECTION, SOLUTION INTRAVENOUS at 08:54

## 2024-11-14 ENCOUNTER — OFFICE VISIT (OUTPATIENT)
Dept: CARDIOLOGY | Facility: CLINIC | Age: 89
End: 2024-11-14
Payer: MEDICARE

## 2024-11-14 VITALS
BODY MASS INDEX: 26.37 KG/M2 | HEART RATE: 81 BPM | SYSTOLIC BLOOD PRESSURE: 130 MMHG | OXYGEN SATURATION: 96 % | WEIGHT: 168 LBS | HEIGHT: 67 IN | DIASTOLIC BLOOD PRESSURE: 86 MMHG

## 2024-11-14 DIAGNOSIS — I10 HYPERTENSION, UNSPECIFIED TYPE: Primary | ICD-10-CM

## 2024-11-14 DIAGNOSIS — M25.50 ARTHRALGIA, UNSPECIFIED JOINT: ICD-10-CM

## 2024-11-14 DIAGNOSIS — I71.40 ABDOMINAL AORTIC ANEURYSM (AAA) WITHOUT RUPTURE, UNSPECIFIED PART: ICD-10-CM

## 2024-11-14 DIAGNOSIS — Z91.89 MULTIPLE RISK FACTORS FOR CORONARY ARTERY DISEASE: ICD-10-CM

## 2024-11-14 DIAGNOSIS — E78.5 HYPERLIPIDEMIA, UNSPECIFIED HYPERLIPIDEMIA TYPE: ICD-10-CM

## 2024-11-15 NOTE — PROGRESS NOTES
Subjective     Celso Mcelroy is a 88 y.o. male.   Chief Complaint   Patient presents with    Hypertension     History of Present Illness   Celso Mcelroy is a 88-year-old male who presents to clinic today for cardiology follow-up.     Hypertension currently on Valsartan 320 mg daily and Norvasc 2.5 mg daily. Intermittent monitoring with normal readings. Denies hypotension. Reports compliance with medications. Denies chest pain, dyspnea, palpitations, dizziness or syncope.  He exercises on a regular basis and denies any acute complaint.     Hyperlipidemia on Zocor 20 mg nightly and TriCor 145 mg daily.  He denies myalgias.   No recent labs available for review.     S/p abdominal aortic aneurysm following with Dr. Celeste.  He denies abdominal or back pain. History of an abdominal aortic aneurysm repair at the [facility] in 2001.  He had CT imaging in September but reports his appointment was canceled, he would like to discuss results today.      He had some problems with anemia but that seems to have resolved.  Primary continues to follow closely.  He was hospitalized at [facility] for arthralgias in June he is continuing to treat muscle aches and pains conservatively.  He continues to have problems with his knee and shoulder and back.  He plans to follow-up with urology in the near future    Patient Active Problem List   Diagnosis    Nocturia    Prostate cancer    Erectile dysfunction    AAA (abdominal aortic aneurysm) without rupture s/p open repair 2001     Past Medical History:   Diagnosis Date    AAA (abdominal aortic aneurysm)     Arthritis     Cancer     Coronary artery disease     Elevated cholesterol     Glaucoma     Hypertension     Prostate cancer     Skin cancer      Past Surgical History:   Procedure Laterality Date    ABDOMINAL AORTIC ANEURYSM REPAIR      ABDOMINAL AORTIC ANEURYSM REPAIR      ABDOMINAL AORTIC ANEURYSM REPAIR      BACK SURGERY      cervical    COLONOSCOPY  05/2019     CYBERKNIFE  2020    Prostate    ENDOSCOPY      EYE SURGERY Bilateral     iol-Cataract and Scar Tissue removed right eye    GALLBLADDER SURGERY      PROSTATE FIDUCIAL MARKER PLACEMENT N/A 2019    Procedure: PROSTATE FIDUCIAL MARKER PLACEMENT WITH 6 MARKERS;  Surgeon: Tom Pineda MD;  Location: [facility];  Service: Urology       Family History   Problem Relation Age of Onset    No Known Problems Father     No Known Problems Mother      Social History     Tobacco Use    Smoking status: Former     Current packs/day: 0.00     Average packs/day: 0.5 packs/day for 15.0 years (7.5 ttl pk-yrs)     Types: Cigarettes     Start date: 6/15/1966     Quit date: 6/15/1981     Years since quittin.4     Passive exposure: Past    Smokeless tobacco: Former     Types: Chew     Quit date: 6/15/1961   Vaping Use    Vaping status: Never Used   Substance Use Topics    Alcohol use: No    Drug use: No         The following portions of the patient's history were reviewed and updated as appropriate: allergies, current medications, past family history, past medical history, past social history, past surgical history and problem list.    Allergies   Allergen Reactions    Percocet [Oxycodone-Acetaminophen] Nausea Only    Codeine Other (See Comments)     headaches    Morphine And Codeine Hallucinations         Current Outpatient Medications:     acetaminophen (TYLENOL) 650 MG 8 hr tablet, Take 1 tablet by mouth Every 8 (Eight) Hours As Needed for Mild Pain., Disp: , Rfl:     amLODIPine (NORVASC) 2.5 MG tablet, 1 tablet Every Evening., Disp: , Rfl:     cetirizine (zyrTEC) 10 MG tablet, Take 1 tablet by mouth Daily., Disp: , Rfl:     Diclofenac Sodium (VOLTAREN) 1 % gel gel, Apply 4 g topically to the appropriate area as directed 4 (Four) Times a Day As Needed., Disp: , Rfl:     Farxiga 5 MG tablet tablet, Take 1 tablet by mouth Daily., Disp: , Rfl:     fenofibrate (TRICOR) 145 MG tablet, Take 1 tablet by mouth Daily.,  "Disp: , Rfl:     FeroSul 325 (65 Fe) MG tablet, Take 1 tablet by mouth Every Other Day., Disp: , Rfl:     fluticasone (FLONASE) 50 MCG/ACT nasal spray, As Needed., Disp: , Rfl:     latanoprost (XALATAN) 0.005 % ophthalmic solution, Administer 1 drop to both eyes Every Night., Disp: , Rfl:     multivitamin (THERAGRAN) tablet tablet, Take 1 tablet by mouth Daily., Disp: , Rfl:     pantoprazole (PROTONIX) 40 MG EC tablet, Take 1 tablet by mouth., Disp: , Rfl:     simvastatin (ZOCOR) 20 MG tablet, Take 1 tablet by mouth Every Night., Disp: , Rfl:     valsartan (DIOVAN) 320 MG tablet, Take 1 tablet by mouth Daily., Disp: , Rfl:     Review of Systems   Constitutional:  Negative for activity change, appetite change, chills, diaphoresis, fatigue and fever.   HENT:  Negative for congestion, drooling, ear discharge, ear pain, mouth sores, nosebleeds, postnasal drip, rhinorrhea, sinus pressure, sneezing and sore throat.    Eyes:  Negative for pain, discharge and visual disturbance.   Respiratory:  Negative for cough, chest tightness, shortness of breath and wheezing.    Cardiovascular:  Negative for chest pain, palpitations and leg swelling.   Gastrointestinal:  Negative for abdominal pain, constipation, diarrhea, nausea and vomiting.   Endocrine: Negative for cold intolerance, heat intolerance, polydipsia, polyphagia and polyuria.   Musculoskeletal:  Positive for arthralgias. Negative for myalgias and neck pain.   Skin:  Negative for rash and wound.   Neurological:  Negative for syncope, speech difficulty, weakness, light-headedness and headaches.   Hematological:  Negative for adenopathy. Does not bruise/bleed easily.   Psychiatric/Behavioral:  Negative for confusion, dysphoric mood and sleep disturbance. The patient is not nervous/anxious.    All other systems reviewed and are negative.    /86 (BP Location: Left arm, Patient Position: Sitting, Cuff Size: Adult)   Pulse 81   Ht 170.2 cm (67\")   Wt 76.2 kg (168 lb)  "  SpO2 96%   BMI 26.31 kg/m²     Objective   Allergies   Allergen Reactions    Percocet [Oxycodone-Acetaminophen] Nausea Only    Codeine Other (See Comments)     headaches    Morphine And Codeine Hallucinations     Physical Exam  Vitals reviewed.   Constitutional:       Appearance: Normal appearance. He is well-developed.      Comments: Uses a cane    HENT:      Head: Normocephalic.   Eyes:      Conjunctiva/sclera: Conjunctivae normal.   Neck:      Thyroid: No thyromegaly.      Vascular: No carotid bruit or JVD.   Cardiovascular:      Rate and Rhythm: Normal rate and regular rhythm.   Pulmonary:      Effort: Pulmonary effort is normal.      Breath sounds: Normal breath sounds.   Abdominal:      General: Bowel sounds are normal.      Palpations: Abdomen is soft. There is no hepatomegaly or splenomegaly.      Tenderness: There is no abdominal tenderness.   Musculoskeletal:      Cervical back: Neck supple.      Right lower leg: No edema.      Left lower leg: No edema.   Skin:     General: Skin is warm and dry.   Neurological:      Mental Status: He is alert and oriented to person, place, and time.   Psychiatric:         Attention and Perception: Attention normal.         Mood and Affect: Mood normal.         Speech: Speech normal.         Behavior: Behavior normal. Behavior is cooperative.         Cognition and Memory: Cognition normal.           ECG 12 Lead    Date/Time: 2024 12:32 PM  Performed by: Charlette Lewis APRN    Authorized by: Charlette Lewis APRN  Comparison: compared with previous ECG   Similar to previous ECG  Comparison to previous EC2023  Rhythm: sinus rhythm  Rate: normal  BPM: 79  QRS axis: left    Clinical impression: abnormal EKG  Comments: QT/QTc - 345/380          LABS  WBC   Date Value Ref Range Status   2019 6.22 3.40 - 10.80 10*3/mm3 Final     RBC   Date Value Ref Range Status   2019 4.49 4.14 - 5.80 10*6/mm3 Final     Hemoglobin   Date Value Ref Range Status    12/09/2019 14.3 13.0 - 17.7 g/dL Final     Hematocrit   Date Value Ref Range Status   12/09/2019 42.5 37.5 - 51.0 % Final     MCV   Date Value Ref Range Status   12/09/2019 94.7 79.0 - 97.0 fL Final     MCH   Date Value Ref Range Status   12/09/2019 31.8 26.6 - 33.0 pg Final     MCHC   Date Value Ref Range Status   12/09/2019 33.6 31.5 - 35.7 g/dL Final     RDW   Date Value Ref Range Status   12/09/2019 12.2 (L) 12.3 - 15.4 % Final     RDW-SD   Date Value Ref Range Status   12/09/2019 42.6 37.0 - 54.0 fl Final     MPV   Date Value Ref Range Status   12/09/2019 10.5 6.0 - 12.0 fL Final     Platelets   Date Value Ref Range Status   12/09/2019 253 140 - 450 10*3/mm3 Final       Triglycerides   Date Value Ref Range Status   11/16/2022 249 (H) 0 - 149 mg/dL Final     HDL Cholesterol   Date Value Ref Range Status   11/16/2022 38 (L) >39 mg/dL Final     LDL Chol Calc (NIH)   Date Value Ref Range Status   11/16/2022 67 0 - 99 mg/dL Final     IMAGING   CT Angiogram Abdomen Pelvis    Result Date: 9/5/2024  1.  Repaired infrarenal abdominal aortic aneurysm appears not aneurysmal. 2.  Left common iliac artery measures 1.5 cm in the right 1.6 cm. 3.  The proximal right renal artery is not evaluated due to streak artifact from adjacent staple.   This report was finalized on 9/5/2024 9:55 AM by Dr. Claudio Holm MD.              Assessment & Plan   Diagnoses and all orders for this visit:    1. Hypertension, unspecified type (Primary)  -     ECG 12 Lead  EKG reviewed and discussed, no acute changes  BP well-controlled, continue current therapy  Routine monitoring recommended    2. Hyperlipidemia, unspecified hyperlipidemia type  Continue on statin, heart healthy diet  Request most recent labs from PCP    3. Abdominal aortic aneurysm (AAA) without rupture, unspecified part  Discussed and reviewed CT recommend rescheduling annual follow-up if they desire, if not we will continue annual surveillance.  He is clinically  asymptomatic    4. Multiple risk factors for coronary artery disease  Continue with aggressive risk factor modification  Clinically asymptomatic  Continue to monitor with further workup as warranted    5. Arthralgia, unspecified joint  Management by primary          Review of medical record    Lifestyle modifications including heart healthy diet, regular exercise, maintenance of desirable body weight and avoidance of tobacco products     Follow-up in 6 months, sooner if needed

## 2025-03-05 ENCOUNTER — TELEPHONE (OUTPATIENT)
Dept: CARDIOLOGY | Facility: CLINIC | Age: OVER 89
End: 2025-03-05

## 2025-03-05 NOTE — TELEPHONE ENCOUNTER
“Please be informed that patient has passed. Patient has been marked  in the system. The date of death is: 25    Caller: Espinoza Mcelroy    Relationship: Emergency Contact    Best call back number: [phone]

## (undated) DEVICE — GLV SURG PREMIERPRO MIC LTX PF SZ8 BRN

## (undated) DEVICE — JELLY,LUBE,STERILE,FLIP TOP,TUBE,4-OZ: Brand: MEDLINE

## (undated) DEVICE — HOLDER: Brand: DEROYAL

## (undated) DEVICE — TOWEL,OR,DSP,ST,BLUE,STD,4/PK,20PK/CS: Brand: MEDLINE